# Patient Record
Sex: FEMALE | Race: OTHER
[De-identification: names, ages, dates, MRNs, and addresses within clinical notes are randomized per-mention and may not be internally consistent; named-entity substitution may affect disease eponyms.]

---

## 2017-01-09 ENCOUNTER — HOSPITAL ENCOUNTER (EMERGENCY)
Dept: HOSPITAL 15 - ER | Age: 82
Discharge: HOME | End: 2017-01-09
Payer: COMMERCIAL

## 2017-01-09 VITALS — SYSTOLIC BLOOD PRESSURE: 145 MMHG | DIASTOLIC BLOOD PRESSURE: 72 MMHG

## 2017-01-09 VITALS — HEIGHT: 62 IN | WEIGHT: 140 LBS | BODY MASS INDEX: 25.76 KG/M2

## 2017-01-09 DIAGNOSIS — I10: ICD-10-CM

## 2017-01-09 DIAGNOSIS — E78.5: ICD-10-CM
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
## 2017-01-09 DIAGNOSIS — G89.29: ICD-10-CM

## 2017-01-09 DIAGNOSIS — K51.90: ICD-10-CM

## 2017-01-09 DIAGNOSIS — S39.012A: Primary | ICD-10-CM
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
## 2017-01-09 LAB
HCT VFR BLD AUTO: 41.3 % (ref 36–46)
HGB BLD-MCNC: 13.9 G/DL (ref 12.2–16.2)
MCH RBC QN AUTO: 31.1 PG (ref 28–32)
MCV RBC AUTO: 92.6 FL (ref 80–100)
NRBC BLD QL AUTO: 0 %

## 2017-01-09 PROCEDURE — 93005 ELECTROCARDIOGRAM TRACING: CPT

## 2017-01-09 PROCEDURE — 36415 COLL VENOUS BLD VENIPUNCTURE: CPT

## 2017-01-09 PROCEDURE — 85025 COMPLETE CBC W/AUTO DIFF WBC: CPT

## 2017-05-17 ENCOUNTER — HOSPITAL ENCOUNTER (OUTPATIENT)
Dept: HOSPITAL 15 - LAB | Age: 82
Discharge: HOME | End: 2017-05-17
Attending: INTERNAL MEDICINE
Payer: COMMERCIAL

## 2017-05-17 DIAGNOSIS — E78.5: ICD-10-CM

## 2017-05-17 DIAGNOSIS — I10: Primary | ICD-10-CM

## 2017-05-17 LAB
ALBUMIN SERPL-MCNC: 4 G/DL (ref 3.4–5)
ALP SERPL-CCNC: 81 U/L (ref 45–117)
ANION GAP SERPL CALCULATED.3IONS-SCNC: 9 MMOL/L (ref 5–15)
BILIRUB SERPL-MCNC: 0.4 MG/DL (ref 0.2–1)
BUN SERPL-MCNC: 14 MG/DL (ref 7–18)
BUN/CREAT SERPL: 12.8
CALCIUM SERPL-MCNC: 9.5 MG/DL (ref 8.5–10.1)
CHLORIDE SERPL-SCNC: 100 MMOL/L (ref 98–107)
CHOLEST SERPL-MCNC: 181 MG/DL (ref ?–200)
CO2 SERPL-SCNC: 30 MMOL/L (ref 21–32)
GLUCOSE SERPL-MCNC: 98 MG/DL (ref 74–106)
HCT VFR BLD AUTO: 40.9 % (ref 36–46)
HDLC SERPL-MCNC: 77 MG/DL (ref 40–59)
HGB BLD-MCNC: 13.9 G/DL (ref 12.2–16.2)
MCH RBC QN AUTO: 31.6 PG (ref 28–32)
MCV RBC AUTO: 92.4 FL (ref 80–100)
NRBC BLD QL AUTO: 0 %
POTASSIUM SERPL-SCNC: 4.1 MMOL/L (ref 3.5–5.1)
PROT SERPL-MCNC: 7.8 G/DL (ref 6.4–8.2)
SODIUM SERPL-SCNC: 139 MMOL/L (ref 136–145)
TRIGL SERPL-MCNC: 159 MG/DL (ref ?–150)

## 2017-05-17 PROCEDURE — 36415 COLL VENOUS BLD VENIPUNCTURE: CPT

## 2017-05-17 PROCEDURE — 85025 COMPLETE CBC W/AUTO DIFF WBC: CPT

## 2017-05-17 PROCEDURE — 81001 URINALYSIS AUTO W/SCOPE: CPT

## 2017-05-17 PROCEDURE — 80061 LIPID PANEL: CPT

## 2017-05-17 PROCEDURE — 80053 COMPREHEN METABOLIC PANEL: CPT

## 2017-10-27 LAB
APTT PPP: 29.2 SEC (ref 22.64–33.71)
HCT VFR BLD AUTO: 37.9 % (ref 36–46)
HGB BLD-MCNC: 13.3 G/DL (ref 12.2–16.2)
INR PPP: 1.02 (ref 0.9–1.15)
MCH RBC QN AUTO: 32.7 PG (ref 28–32)
MCV RBC AUTO: 92.9 FL (ref 80–100)
NRBC BLD QL AUTO: 0.1 %
PROTHROMBIN TIME: 11.1 SEC (ref 9.37–12.3)

## 2017-10-30 ENCOUNTER — HOSPITAL ENCOUNTER (OUTPATIENT)
Dept: HOSPITAL 15 - GI | Age: 82
Discharge: HOME | End: 2017-10-30
Attending: INTERNAL MEDICINE
Payer: COMMERCIAL

## 2017-10-30 VITALS — HEIGHT: 62 IN | WEIGHT: 138 LBS | BODY MASS INDEX: 25.4 KG/M2

## 2017-10-30 VITALS — DIASTOLIC BLOOD PRESSURE: 72 MMHG | SYSTOLIC BLOOD PRESSURE: 131 MMHG

## 2017-10-30 DIAGNOSIS — K64.8: Primary | ICD-10-CM

## 2017-10-30 DIAGNOSIS — K57.30: ICD-10-CM

## 2017-10-30 DIAGNOSIS — Z90.49: ICD-10-CM

## 2017-10-30 PROCEDURE — 85025 COMPLETE CBC W/AUTO DIFF WBC: CPT

## 2017-10-30 PROCEDURE — 85730 THROMBOPLASTIN TIME PARTIAL: CPT

## 2017-10-30 PROCEDURE — 85610 PROTHROMBIN TIME: CPT

## 2017-10-30 PROCEDURE — 36415 COLL VENOUS BLD VENIPUNCTURE: CPT

## 2017-10-30 PROCEDURE — 45378 DIAGNOSTIC COLONOSCOPY: CPT

## 2017-10-30 PROCEDURE — 99152 MOD SED SAME PHYS/QHP 5/>YRS: CPT

## 2017-10-30 RX ADMIN — MIDAZOLAM HYDROCHLORIDE ONE MG: 5 INJECTION INTRAMUSCULAR; INTRAVENOUS at 09:42

## 2017-10-30 RX ADMIN — FENTANYL CITRATE ONE MCG: 50 INJECTION, SOLUTION INTRAMUSCULAR; INTRAVENOUS at 09:48

## 2017-10-30 RX ADMIN — FENTANYL CITRATE ONE MCG: 50 INJECTION, SOLUTION INTRAMUSCULAR; INTRAVENOUS at 09:42

## 2017-10-30 RX ADMIN — MIDAZOLAM HYDROCHLORIDE ONE MG: 5 INJECTION INTRAMUSCULAR; INTRAVENOUS at 09:48

## 2018-08-13 ENCOUNTER — HOSPITAL ENCOUNTER (EMERGENCY)
Dept: HOSPITAL 15 - ER | Age: 83
Discharge: HOME | End: 2018-08-13
Payer: COMMERCIAL

## 2018-08-13 VITALS — BODY MASS INDEX: 25.4 KG/M2 | HEIGHT: 62 IN | WEIGHT: 138 LBS

## 2018-08-13 VITALS — SYSTOLIC BLOOD PRESSURE: 152 MMHG | DIASTOLIC BLOOD PRESSURE: 74 MMHG

## 2018-08-13 DIAGNOSIS — E78.5: ICD-10-CM

## 2018-08-13 DIAGNOSIS — F41.9: Primary | ICD-10-CM

## 2018-08-13 DIAGNOSIS — Z79.899: ICD-10-CM

## 2018-08-13 DIAGNOSIS — I10: ICD-10-CM

## 2018-08-13 LAB
ALBUMIN SERPL-MCNC: 3.9 G/DL (ref 3.4–5)
ALP SERPL-CCNC: 70 U/L (ref 45–117)
ALT SERPL-CCNC: 24 U/L (ref 13–56)
ANION GAP SERPL CALCULATED.3IONS-SCNC: 12 MMOL/L (ref 5–15)
BILIRUB SERPL-MCNC: 0.4 MG/DL (ref 0.2–1)
BUN SERPL-MCNC: 16 MG/DL (ref 7–18)
BUN/CREAT SERPL: 12.2
CALCIUM SERPL-MCNC: 8.7 MG/DL (ref 8.5–10.1)
CHLORIDE SERPL-SCNC: 99 MMOL/L (ref 98–107)
CO2 SERPL-SCNC: 25 MMOL/L (ref 21–32)
GLUCOSE SERPL-MCNC: 101 MG/DL (ref 74–106)
HCT VFR BLD AUTO: 38.7 % (ref 36–46)
HGB BLD-MCNC: 13.6 G/DL (ref 12.2–16.2)
MCH RBC QN AUTO: 32.9 PG (ref 28–32)
MCV RBC AUTO: 93.9 FL (ref 80–100)
NRBC BLD QL AUTO: 0.1 %
POTASSIUM SERPL-SCNC: 3.8 MMOL/L (ref 3.5–5.1)
PROT SERPL-MCNC: 7.6 G/DL (ref 6.4–8.2)
SODIUM SERPL-SCNC: 136 MMOL/L (ref 136–145)

## 2018-08-13 PROCEDURE — 85025 COMPLETE CBC W/AUTO DIFF WBC: CPT

## 2018-08-13 PROCEDURE — 70360 X-RAY EXAM OF NECK: CPT

## 2018-08-13 PROCEDURE — 36415 COLL VENOUS BLD VENIPUNCTURE: CPT

## 2018-08-13 PROCEDURE — 84484 ASSAY OF TROPONIN QUANT: CPT

## 2018-08-13 PROCEDURE — 80053 COMPREHEN METABOLIC PANEL: CPT

## 2018-08-13 PROCEDURE — 93005 ELECTROCARDIOGRAM TRACING: CPT

## 2018-10-26 ENCOUNTER — HOSPITAL ENCOUNTER (OUTPATIENT)
Dept: HOSPITAL 15 - LAB | Age: 83
Discharge: HOME | End: 2018-10-26
Attending: NURSE PRACTITIONER
Payer: COMMERCIAL

## 2018-10-26 DIAGNOSIS — Z12.11: Primary | ICD-10-CM

## 2018-10-26 PROCEDURE — 82270 OCCULT BLOOD FECES: CPT

## 2018-11-20 ENCOUNTER — HOSPITAL ENCOUNTER (EMERGENCY)
Dept: HOSPITAL 15 - ER | Age: 83
Discharge: HOME | End: 2018-11-20
Payer: COMMERCIAL

## 2018-11-20 VITALS — HEIGHT: 63 IN | WEIGHT: 140 LBS | BODY MASS INDEX: 24.8 KG/M2

## 2018-11-20 VITALS — DIASTOLIC BLOOD PRESSURE: 77 MMHG | SYSTOLIC BLOOD PRESSURE: 126 MMHG

## 2018-11-20 DIAGNOSIS — I10: ICD-10-CM

## 2018-11-20 DIAGNOSIS — E78.5: ICD-10-CM

## 2018-11-20 DIAGNOSIS — B34.9: Primary | ICD-10-CM

## 2018-12-27 ENCOUNTER — HOSPITAL ENCOUNTER (OUTPATIENT)
Dept: HOSPITAL 15 - LAB | Age: 83
Discharge: HOME | End: 2018-12-27
Attending: NURSE PRACTITIONER
Payer: COMMERCIAL

## 2018-12-27 DIAGNOSIS — E78.5: Primary | ICD-10-CM

## 2018-12-27 LAB
ALBUMIN SERPL-MCNC: 3.9 G/DL (ref 3.4–5)
ALP SERPL-CCNC: 80 U/L (ref 45–117)
ALT SERPL-CCNC: 20 U/L (ref 13–56)
ANION GAP SERPL CALCULATED.3IONS-SCNC: 5 MMOL/L (ref 5–15)
BILIRUB SERPL-MCNC: 0.4 MG/DL (ref 0.2–1)
BUN SERPL-MCNC: 14 MG/DL (ref 7–18)
BUN/CREAT SERPL: 11.9
CALCIUM SERPL-MCNC: 9.5 MG/DL (ref 8.5–10.1)
CHLORIDE SERPL-SCNC: 101 MMOL/L (ref 98–107)
CHOLEST SERPL-MCNC: 199 MG/DL (ref ?–200)
CO2 SERPL-SCNC: 29 MMOL/L (ref 21–32)
GLUCOSE SERPL-MCNC: 102 MG/DL (ref 74–106)
HCT VFR BLD AUTO: 40.9 % (ref 36–46)
HDLC SERPL-MCNC: 74 MG/DL (ref 40–59)
HGB BLD-MCNC: 13.9 G/DL (ref 12.2–16.2)
MCH RBC QN AUTO: 31.6 PG (ref 28–32)
MCV RBC AUTO: 93 FL (ref 80–100)
NRBC BLD QL AUTO: 0 %
POTASSIUM SERPL-SCNC: 3.8 MMOL/L (ref 3.5–5.1)
PROT SERPL-MCNC: 7.7 G/DL (ref 6.4–8.2)
SODIUM SERPL-SCNC: 135 MMOL/L (ref 136–145)
TRIGL SERPL-MCNC: 159 MG/DL (ref ?–150)

## 2018-12-27 PROCEDURE — 81001 URINALYSIS AUTO W/SCOPE: CPT

## 2018-12-27 PROCEDURE — 80053 COMPREHEN METABOLIC PANEL: CPT

## 2018-12-27 PROCEDURE — 84443 ASSAY THYROID STIM HORMONE: CPT

## 2018-12-27 PROCEDURE — 36415 COLL VENOUS BLD VENIPUNCTURE: CPT

## 2018-12-27 PROCEDURE — 80061 LIPID PANEL: CPT

## 2018-12-27 PROCEDURE — 83036 HEMOGLOBIN GLYCOSYLATED A1C: CPT

## 2018-12-27 PROCEDURE — 85025 COMPLETE CBC W/AUTO DIFF WBC: CPT

## 2018-12-27 PROCEDURE — 82306 VITAMIN D 25 HYDROXY: CPT

## 2019-04-04 ENCOUNTER — HOSPITAL ENCOUNTER (OUTPATIENT)
Dept: HOSPITAL 15 - LAB | Age: 84
Discharge: HOME | End: 2019-04-04
Attending: NURSE PRACTITIONER
Payer: COMMERCIAL

## 2019-04-04 DIAGNOSIS — E78.5: Primary | ICD-10-CM

## 2019-04-04 LAB
ALBUMIN SERPL-MCNC: 4.2 G/DL (ref 3.4–5)
ALP SERPL-CCNC: 90 U/L (ref 45–117)
ALT SERPL-CCNC: 21 U/L (ref 13–56)
ANION GAP SERPL CALCULATED.3IONS-SCNC: 4 MMOL/L (ref 5–15)
BILIRUB SERPL-MCNC: 0.5 MG/DL (ref 0.2–1)
BUN SERPL-MCNC: 17 MG/DL (ref 7–18)
BUN/CREAT SERPL: 13.3
CALCIUM SERPL-MCNC: 9.6 MG/DL (ref 8.5–10.1)
CHLORIDE SERPL-SCNC: 103 MMOL/L (ref 98–107)
CHOLEST SERPL-MCNC: 204 MG/DL (ref ?–200)
CO2 SERPL-SCNC: 29 MMOL/L (ref 21–32)
GLUCOSE SERPL-MCNC: 100 MG/DL (ref 74–106)
HCT VFR BLD AUTO: 42.9 % (ref 36–46)
HDLC SERPL-MCNC: 70 MG/DL (ref 40–59)
HGB BLD-MCNC: 14.7 G/DL (ref 12.2–16.2)
MCH RBC QN AUTO: 32 PG (ref 28–32)
MCV RBC AUTO: 93.5 FL (ref 80–100)
NRBC BLD QL AUTO: 0 %
POTASSIUM SERPL-SCNC: 4 MMOL/L (ref 3.5–5.1)
PROT SERPL-MCNC: 8.1 G/DL (ref 6.4–8.2)
SODIUM SERPL-SCNC: 136 MMOL/L (ref 136–145)
TRIGL SERPL-MCNC: 233 MG/DL (ref ?–150)

## 2019-04-04 PROCEDURE — 36415 COLL VENOUS BLD VENIPUNCTURE: CPT

## 2019-04-04 PROCEDURE — 85025 COMPLETE CBC W/AUTO DIFF WBC: CPT

## 2019-04-04 PROCEDURE — 82607 VITAMIN B-12: CPT

## 2019-04-04 PROCEDURE — 82746 ASSAY OF FOLIC ACID SERUM: CPT

## 2019-04-04 PROCEDURE — 83036 HEMOGLOBIN GLYCOSYLATED A1C: CPT

## 2019-04-04 PROCEDURE — 82306 VITAMIN D 25 HYDROXY: CPT

## 2019-04-04 PROCEDURE — 82043 UR ALBUMIN QUANTITATIVE: CPT

## 2019-04-04 PROCEDURE — 84443 ASSAY THYROID STIM HORMONE: CPT

## 2019-04-04 PROCEDURE — 81001 URINALYSIS AUTO W/SCOPE: CPT

## 2019-04-04 PROCEDURE — 80061 LIPID PANEL: CPT

## 2019-04-04 PROCEDURE — 80053 COMPREHEN METABOLIC PANEL: CPT

## 2019-07-08 ENCOUNTER — HOSPITAL ENCOUNTER (OUTPATIENT)
Dept: HOSPITAL 15 - LAB | Age: 84
Discharge: HOME | End: 2019-07-08
Attending: NURSE PRACTITIONER
Payer: COMMERCIAL

## 2019-07-08 DIAGNOSIS — E78.5: Primary | ICD-10-CM

## 2019-07-08 LAB
ALBUMIN SERPL-MCNC: 3.9 G/DL (ref 3.4–5)
ALP SERPL-CCNC: 92 U/L (ref 45–117)
ALT SERPL-CCNC: 18 U/L (ref 13–56)
ANION GAP SERPL CALCULATED.3IONS-SCNC: 7 MMOL/L (ref 5–15)
BILIRUB SERPL-MCNC: 0.5 MG/DL (ref 0.2–1)
BUN SERPL-MCNC: 17 MG/DL (ref 7–18)
BUN/CREAT SERPL: 13.2
CALCIUM SERPL-MCNC: 9.4 MG/DL (ref 8.5–10.1)
CHLORIDE SERPL-SCNC: 104 MMOL/L (ref 98–107)
CHOLEST SERPL-MCNC: 181 MG/DL (ref ?–200)
CO2 SERPL-SCNC: 28 MMOL/L (ref 21–32)
GLUCOSE SERPL-MCNC: 100 MG/DL (ref 74–106)
HCT VFR BLD AUTO: 37.9 % (ref 36–46)
HDLC SERPL-MCNC: 74 MG/DL (ref 40–59)
HGB BLD-MCNC: 13.2 G/DL (ref 12.2–16.2)
MCH RBC QN AUTO: 32.2 PG (ref 28–32)
MCV RBC AUTO: 92.8 FL (ref 80–100)
NRBC BLD QL AUTO: 0 %
POTASSIUM SERPL-SCNC: 4.3 MMOL/L (ref 3.5–5.1)
PROT SERPL-MCNC: 7.5 G/DL (ref 6.4–8.2)
SODIUM SERPL-SCNC: 139 MMOL/L (ref 136–145)
TRIGL SERPL-MCNC: 161 MG/DL (ref ?–150)

## 2019-07-08 PROCEDURE — 36415 COLL VENOUS BLD VENIPUNCTURE: CPT

## 2019-07-08 PROCEDURE — 80061 LIPID PANEL: CPT

## 2019-07-08 PROCEDURE — 84443 ASSAY THYROID STIM HORMONE: CPT

## 2019-07-08 PROCEDURE — 83036 HEMOGLOBIN GLYCOSYLATED A1C: CPT

## 2019-07-08 PROCEDURE — 81001 URINALYSIS AUTO W/SCOPE: CPT

## 2019-07-08 PROCEDURE — 85025 COMPLETE CBC W/AUTO DIFF WBC: CPT

## 2019-07-08 PROCEDURE — 80053 COMPREHEN METABOLIC PANEL: CPT

## 2019-09-09 ENCOUNTER — HOSPITAL ENCOUNTER (OUTPATIENT)
Dept: HOSPITAL 15 - LAB | Age: 84
Discharge: HOME | End: 2019-09-09
Attending: NURSE PRACTITIONER
Payer: COMMERCIAL

## 2019-09-09 DIAGNOSIS — R73.03: Primary | ICD-10-CM

## 2019-09-09 LAB
ALBUMIN SERPL-MCNC: 3.9 G/DL (ref 3.4–5)
ALP SERPL-CCNC: 75 U/L (ref 45–117)
ALT SERPL-CCNC: 14 U/L (ref 13–56)
ANION GAP SERPL CALCULATED.3IONS-SCNC: 7 MMOL/L (ref 5–15)
BILIRUB SERPL-MCNC: 0.4 MG/DL (ref 0.2–1)
BUN SERPL-MCNC: 22 MG/DL (ref 7–18)
BUN/CREAT SERPL: 17.2
CALCIUM SERPL-MCNC: 9.1 MG/DL (ref 8.5–10.1)
CHLORIDE SERPL-SCNC: 106 MMOL/L (ref 98–107)
CO2 SERPL-SCNC: 28 MMOL/L (ref 21–32)
GLUCOSE SERPL-MCNC: 94 MG/DL (ref 74–106)
HCT VFR BLD AUTO: 39.2 % (ref 36–46)
HGB BLD-MCNC: 13.3 G/DL (ref 12.2–16.2)
MCH RBC QN AUTO: 31.9 PG (ref 28–32)
MCV RBC AUTO: 94.1 FL (ref 80–100)
NRBC BLD QL AUTO: 0 %
POTASSIUM SERPL-SCNC: 4.1 MMOL/L (ref 3.5–5.1)
PROT SERPL-MCNC: 7.5 G/DL (ref 6.4–8.2)
SODIUM SERPL-SCNC: 141 MMOL/L (ref 136–145)

## 2019-09-09 PROCEDURE — 80053 COMPREHEN METABOLIC PANEL: CPT

## 2019-09-09 PROCEDURE — 83036 HEMOGLOBIN GLYCOSYLATED A1C: CPT

## 2019-09-09 PROCEDURE — 36415 COLL VENOUS BLD VENIPUNCTURE: CPT

## 2019-09-09 PROCEDURE — 85025 COMPLETE CBC W/AUTO DIFF WBC: CPT

## 2020-10-08 ENCOUNTER — HOSPITAL ENCOUNTER (OUTPATIENT)
Dept: HOSPITAL 15 - LAB | Age: 85
Discharge: HOME | End: 2020-10-08
Attending: NURSE PRACTITIONER
Payer: COMMERCIAL

## 2020-10-08 DIAGNOSIS — E78.5: ICD-10-CM

## 2020-10-08 DIAGNOSIS — I10: Primary | ICD-10-CM

## 2020-10-08 LAB
ALBUMIN SERPL-MCNC: 3.8 G/DL (ref 3.4–5)
ALP SERPL-CCNC: 100 U/L (ref 45–117)
ALT SERPL-CCNC: 15 U/L (ref 13–56)
ANION GAP SERPL CALCULATED.3IONS-SCNC: 6 MMOL/L (ref 5–15)
BILIRUB SERPL-MCNC: 0.6 MG/DL (ref 0.2–1)
BUN SERPL-MCNC: 16 MG/DL (ref 7–18)
BUN/CREAT SERPL: 11
CALCIUM SERPL-MCNC: 9.2 MG/DL (ref 8.5–10.1)
CHLORIDE SERPL-SCNC: 105 MMOL/L (ref 98–107)
CHOLEST SERPL-MCNC: 148 MG/DL (ref ?–200)
CO2 SERPL-SCNC: 27 MMOL/L (ref 21–32)
GLUCOSE SERPL-MCNC: 100 MG/DL (ref 74–106)
HCT VFR BLD AUTO: 35.7 % (ref 36–46)
HDLC SERPL-MCNC: 74 MG/DL (ref 40–59)
HGB BLD-MCNC: 12.4 G/DL (ref 12.2–16.2)
MCH RBC QN AUTO: 32 PG (ref 28–32)
MCV RBC AUTO: 92.4 FL (ref 80–100)
NRBC BLD QL AUTO: 0 %
POTASSIUM SERPL-SCNC: 4 MMOL/L (ref 3.5–5.1)
PROT SERPL-MCNC: 7.4 G/DL (ref 6.4–8.2)
SODIUM SERPL-SCNC: 138 MMOL/L (ref 136–145)
TRIGL SERPL-MCNC: 134 MG/DL (ref ?–150)

## 2020-10-08 PROCEDURE — 80061 LIPID PANEL: CPT

## 2020-10-08 PROCEDURE — 80053 COMPREHEN METABOLIC PANEL: CPT

## 2020-10-08 PROCEDURE — 84443 ASSAY THYROID STIM HORMONE: CPT

## 2020-10-08 PROCEDURE — 81001 URINALYSIS AUTO W/SCOPE: CPT

## 2020-10-08 PROCEDURE — 85025 COMPLETE CBC W/AUTO DIFF WBC: CPT

## 2020-10-08 PROCEDURE — 36415 COLL VENOUS BLD VENIPUNCTURE: CPT

## 2021-05-18 ENCOUNTER — HOSPITAL ENCOUNTER (OUTPATIENT)
Dept: HOSPITAL 15 - LAB | Age: 86
Discharge: HOME | End: 2021-05-18
Attending: NURSE PRACTITIONER
Payer: COMMERCIAL

## 2021-05-18 DIAGNOSIS — E78.5: Primary | ICD-10-CM

## 2021-05-18 DIAGNOSIS — I10: ICD-10-CM

## 2021-05-18 LAB
ALBUMIN SERPL-MCNC: 3.8 G/DL (ref 3.4–5)
ALP SERPL-CCNC: 108 U/L (ref 45–117)
ALT SERPL-CCNC: 20 U/L (ref 13–56)
ANION GAP SERPL CALCULATED.3IONS-SCNC: 6 MMOL/L (ref 5–15)
BILIRUB SERPL-MCNC: 0.5 MG/DL (ref 0.2–1)
BUN SERPL-MCNC: 16 MG/DL (ref 7–18)
BUN/CREAT SERPL: 11.3
CALCIUM SERPL-MCNC: 9.1 MG/DL (ref 8.5–10.1)
CHLORIDE SERPL-SCNC: 106 MMOL/L (ref 98–107)
CHOLEST SERPL-MCNC: 167 MG/DL (ref ?–200)
CO2 SERPL-SCNC: 27 MMOL/L (ref 21–32)
GLUCOSE SERPL-MCNC: 101 MG/DL (ref 74–106)
HCT VFR BLD AUTO: 36.5 % (ref 36–46)
HDLC SERPL-MCNC: 83 MG/DL (ref 40–59)
HGB BLD-MCNC: 12.9 G/DL (ref 12.2–16.2)
MCH RBC QN AUTO: 32.7 PG (ref 28–32)
MCV RBC AUTO: 92.6 FL (ref 80–100)
NRBC BLD QL AUTO: 0 %
POTASSIUM SERPL-SCNC: 4.2 MMOL/L (ref 3.5–5.1)
PROT SERPL-MCNC: 7.5 G/DL (ref 6.4–8.2)
SODIUM SERPL-SCNC: 139 MMOL/L (ref 136–145)
TRIGL SERPL-MCNC: 99 MG/DL (ref ?–150)

## 2021-05-18 PROCEDURE — 85025 COMPLETE CBC W/AUTO DIFF WBC: CPT

## 2021-05-18 PROCEDURE — 36415 COLL VENOUS BLD VENIPUNCTURE: CPT

## 2021-05-18 PROCEDURE — 81001 URINALYSIS AUTO W/SCOPE: CPT

## 2021-05-18 PROCEDURE — 80053 COMPREHEN METABOLIC PANEL: CPT

## 2021-05-18 PROCEDURE — 80061 LIPID PANEL: CPT

## 2022-02-04 ENCOUNTER — HOSPITAL ENCOUNTER (OUTPATIENT)
Dept: HOSPITAL 15 - LAB | Age: 87
Discharge: HOME | End: 2022-02-04
Attending: INTERNAL MEDICINE
Payer: COMMERCIAL

## 2022-02-04 DIAGNOSIS — M35.3: ICD-10-CM

## 2022-02-04 DIAGNOSIS — M05.79: Primary | ICD-10-CM

## 2022-02-04 DIAGNOSIS — R94.5: ICD-10-CM

## 2022-02-04 LAB
ALBUMIN SERPL-MCNC: 3.3 G/DL (ref 3.4–5)
ALP SERPL-CCNC: 91 U/L (ref 45–117)
ALT SERPL-CCNC: 17 U/L (ref 13–56)
ANION GAP SERPL CALCULATED.3IONS-SCNC: 5 MMOL/L (ref 5–15)
BILIRUB SERPL-MCNC: 0.4 MG/DL (ref 0.2–1)
BUN SERPL-MCNC: 11 MG/DL (ref 7–18)
BUN/CREAT SERPL: 7.1
CALCIUM SERPL-MCNC: 8.9 MG/DL (ref 8.5–10.1)
CHLORIDE SERPL-SCNC: 105 MMOL/L (ref 98–107)
CO2 SERPL-SCNC: 25 MMOL/L (ref 21–32)
GLUCOSE SERPL-MCNC: 121 MG/DL (ref 74–106)
HCT VFR BLD AUTO: 34.7 % (ref 36–46)
HGB BLD-MCNC: 11.9 G/DL (ref 12.2–16.2)
MCH RBC QN AUTO: 30.9 PG (ref 28–32)
MCV RBC AUTO: 89.9 FL (ref 80–100)
NRBC BLD QL AUTO: 0.1 %
POTASSIUM SERPL-SCNC: 4.3 MMOL/L (ref 3.5–5.1)
PROT SERPL-MCNC: 7.2 G/DL (ref 6.4–8.2)
SODIUM SERPL-SCNC: 135 MMOL/L (ref 136–145)

## 2022-02-04 PROCEDURE — 36415 COLL VENOUS BLD VENIPUNCTURE: CPT

## 2022-02-04 PROCEDURE — 85025 COMPLETE CBC W/AUTO DIFF WBC: CPT

## 2022-02-04 PROCEDURE — 85652 RBC SED RATE AUTOMATED: CPT

## 2022-02-04 PROCEDURE — 80053 COMPREHEN METABOLIC PANEL: CPT

## 2022-02-04 PROCEDURE — 87340 HEPATITIS B SURFACE AG IA: CPT

## 2022-02-04 PROCEDURE — 86141 C-REACTIVE PROTEIN HS: CPT

## 2022-02-04 PROCEDURE — 86431 RHEUMATOID FACTOR QUANT: CPT

## 2022-02-04 PROCEDURE — 86200 CCP ANTIBODY: CPT

## 2022-02-04 PROCEDURE — 86704 HEP B CORE ANTIBODY TOTAL: CPT

## 2022-02-04 PROCEDURE — 87902 NFCT AGT GNTYP ALYS HEP C: CPT

## 2022-02-14 ENCOUNTER — HOSPITAL ENCOUNTER (OUTPATIENT)
Dept: HOSPITAL 15 - XYW | Age: 87
Discharge: HOME | End: 2022-02-14
Attending: INTERNAL MEDICINE
Payer: COMMERCIAL

## 2022-02-14 DIAGNOSIS — I08.1: Primary | ICD-10-CM

## 2022-02-14 PROCEDURE — 93306 TTE W/DOPPLER COMPLETE: CPT

## 2022-06-13 ENCOUNTER — HOSPITAL ENCOUNTER (OUTPATIENT)
Dept: HOSPITAL 15 - LAB | Age: 87
Discharge: HOME | End: 2022-06-13
Attending: INTERNAL MEDICINE
Payer: COMMERCIAL

## 2022-06-13 DIAGNOSIS — L40.50: Primary | ICD-10-CM

## 2022-06-13 LAB
ALBUMIN SERPL-MCNC: 3.6 G/DL (ref 3.4–5)
ALP SERPL-CCNC: 59 U/L (ref 45–117)
ALT SERPL-CCNC: 23 U/L (ref 13–56)
ANION GAP SERPL CALCULATED.3IONS-SCNC: 7 MMOL/L (ref 5–15)
BILIRUB SERPL-MCNC: 0.4 MG/DL (ref 0.2–1)
BUN SERPL-MCNC: 18 MG/DL (ref 7–18)
BUN/CREAT SERPL: 10.9
CALCIUM SERPL-MCNC: 8.5 MG/DL (ref 8.5–10.1)
CHLORIDE SERPL-SCNC: 108 MMOL/L (ref 98–107)
CO2 SERPL-SCNC: 25 MMOL/L (ref 21–32)
GLUCOSE SERPL-MCNC: 111 MG/DL (ref 74–106)
HCT VFR BLD AUTO: 34.3 % (ref 36–46)
HGB BLD-MCNC: 11.9 G/DL (ref 12.2–16.2)
MCH RBC QN AUTO: 33 PG (ref 28–32)
MCV RBC AUTO: 94.7 FL (ref 80–100)
NRBC BLD QL AUTO: 0 %
POTASSIUM SERPL-SCNC: 4.4 MMOL/L (ref 3.5–5.1)
PROT SERPL-MCNC: 6.5 G/DL (ref 6.4–8.2)
SODIUM SERPL-SCNC: 140 MMOL/L (ref 136–145)

## 2022-06-13 PROCEDURE — 80053 COMPREHEN METABOLIC PANEL: CPT

## 2022-06-13 PROCEDURE — 85652 RBC SED RATE AUTOMATED: CPT

## 2022-06-13 PROCEDURE — 85025 COMPLETE CBC W/AUTO DIFF WBC: CPT

## 2022-06-13 PROCEDURE — 36415 COLL VENOUS BLD VENIPUNCTURE: CPT

## 2022-06-13 PROCEDURE — 86141 C-REACTIVE PROTEIN HS: CPT

## 2022-06-20 ENCOUNTER — HOSPITAL ENCOUNTER (OUTPATIENT)
Dept: HOSPITAL 15 - LAB | Age: 87
Discharge: HOME | End: 2022-06-20
Attending: NURSE PRACTITIONER
Payer: COMMERCIAL

## 2022-06-20 DIAGNOSIS — I10: ICD-10-CM

## 2022-06-20 DIAGNOSIS — E78.5: Primary | ICD-10-CM

## 2022-06-20 LAB
ALBUMIN SERPL-MCNC: 3.6 G/DL (ref 3.4–5)
ALP SERPL-CCNC: 58 U/L (ref 45–117)
ALT SERPL-CCNC: 23 U/L (ref 13–56)
ANION GAP SERPL CALCULATED.3IONS-SCNC: 5 MMOL/L (ref 5–15)
BILIRUB SERPL-MCNC: 0.5 MG/DL (ref 0.2–1)
BUN SERPL-MCNC: 11 MG/DL (ref 7–18)
BUN/CREAT SERPL: 7
CALCIUM SERPL-MCNC: 9.2 MG/DL (ref 8.5–10.1)
CHLORIDE SERPL-SCNC: 104 MMOL/L (ref 98–107)
CHOLEST SERPL-MCNC: 182 MG/DL (ref ?–200)
CO2 SERPL-SCNC: 28 MMOL/L (ref 21–32)
GLUCOSE SERPL-MCNC: 96 MG/DL (ref 74–106)
HCT VFR BLD AUTO: 36.4 % (ref 36–46)
HDLC SERPL-MCNC: 99 MG/DL (ref 40–59)
HGB BLD-MCNC: 12.8 G/DL (ref 12.2–16.2)
MCH RBC QN AUTO: 33.2 PG (ref 28–32)
MCV RBC AUTO: 94.2 FL (ref 80–100)
NRBC BLD QL AUTO: 0 %
POTASSIUM SERPL-SCNC: 3.9 MMOL/L (ref 3.5–5.1)
PROT SERPL-MCNC: 6.8 G/DL (ref 6.4–8.2)
SODIUM SERPL-SCNC: 137 MMOL/L (ref 136–145)
TRIGL SERPL-MCNC: 171 MG/DL (ref ?–150)

## 2022-06-20 PROCEDURE — 80053 COMPREHEN METABOLIC PANEL: CPT

## 2022-06-20 PROCEDURE — 80061 LIPID PANEL: CPT

## 2022-06-20 PROCEDURE — 36415 COLL VENOUS BLD VENIPUNCTURE: CPT

## 2022-06-20 PROCEDURE — 81001 URINALYSIS AUTO W/SCOPE: CPT

## 2022-06-20 PROCEDURE — 85025 COMPLETE CBC W/AUTO DIFF WBC: CPT

## 2022-07-05 ENCOUNTER — HOSPITAL ENCOUNTER (OUTPATIENT)
Dept: HOSPITAL 15 - XYW | Age: 87
Discharge: HOME | End: 2022-07-05
Attending: INTERNAL MEDICINE
Payer: COMMERCIAL

## 2022-07-05 VITALS — DIASTOLIC BLOOD PRESSURE: 54 MMHG | SYSTOLIC BLOOD PRESSURE: 129 MMHG

## 2022-07-05 VITALS — HEIGHT: 63 IN | WEIGHT: 135 LBS | BODY MASS INDEX: 23.92 KG/M2

## 2022-07-05 DIAGNOSIS — I10: ICD-10-CM

## 2022-07-05 DIAGNOSIS — I49.9: ICD-10-CM

## 2022-07-05 DIAGNOSIS — I47.1: Primary | ICD-10-CM

## 2022-07-05 PROCEDURE — 93017 CV STRESS TEST TRACING ONLY: CPT

## 2022-07-05 PROCEDURE — 78452 HT MUSCLE IMAGE SPECT MULT: CPT

## 2022-09-13 ENCOUNTER — HOSPITAL ENCOUNTER (OUTPATIENT)
Dept: HOSPITAL 15 - LAB | Age: 87
Discharge: HOME | End: 2022-09-13
Attending: INTERNAL MEDICINE
Payer: COMMERCIAL

## 2022-09-13 DIAGNOSIS — L40.50: Primary | ICD-10-CM

## 2022-09-13 LAB
ALBUMIN SERPL-MCNC: 3.8 G/DL (ref 3.4–5)
ALP SERPL-CCNC: 58 U/L (ref 45–117)
ALT SERPL-CCNC: 22 U/L (ref 13–56)
ANION GAP SERPL CALCULATED.3IONS-SCNC: 6 MMOL/L (ref 5–15)
BILIRUB SERPL-MCNC: 0.4 MG/DL (ref 0.2–1)
BUN SERPL-MCNC: 15 MG/DL (ref 7–18)
BUN/CREAT SERPL: 9.3
CALCIUM SERPL-MCNC: 8.8 MG/DL (ref 8.5–10.1)
CHLORIDE SERPL-SCNC: 106 MMOL/L (ref 98–107)
CO2 SERPL-SCNC: 28 MMOL/L (ref 21–32)
GLUCOSE SERPL-MCNC: 100 MG/DL (ref 74–106)
HCT VFR BLD AUTO: 37.6 % (ref 36–46)
HGB BLD-MCNC: 12.6 G/DL (ref 12.2–16.2)
MCH RBC QN AUTO: 32.7 PG (ref 28–32)
MCV RBC AUTO: 97.6 FL (ref 80–100)
NRBC BLD QL AUTO: 0 %
POTASSIUM SERPL-SCNC: 4.1 MMOL/L (ref 3.5–5.1)
PROT SERPL-MCNC: 6.5 G/DL (ref 6.4–8.2)
SODIUM SERPL-SCNC: 140 MMOL/L (ref 136–145)

## 2022-09-13 PROCEDURE — 85025 COMPLETE CBC W/AUTO DIFF WBC: CPT

## 2022-09-13 PROCEDURE — 80053 COMPREHEN METABOLIC PANEL: CPT

## 2022-09-13 PROCEDURE — 36415 COLL VENOUS BLD VENIPUNCTURE: CPT

## 2022-09-13 PROCEDURE — 86141 C-REACTIVE PROTEIN HS: CPT

## 2022-09-13 PROCEDURE — 85652 RBC SED RATE AUTOMATED: CPT

## 2023-03-20 ENCOUNTER — HOSPITAL ENCOUNTER (OUTPATIENT)
Dept: HOSPITAL 15 - LAB | Age: 88
Discharge: HOME | End: 2023-03-20
Attending: NURSE PRACTITIONER
Payer: COMMERCIAL

## 2023-03-20 DIAGNOSIS — E78.5: ICD-10-CM

## 2023-03-20 DIAGNOSIS — I10: Primary | ICD-10-CM

## 2023-03-20 LAB
ALBUMIN SERPL-MCNC: 3.5 G/DL (ref 3.4–5)
ALP SERPL-CCNC: 71 U/L (ref 45–117)
ALT SERPL-CCNC: 19 U/L (ref 13–56)
ANION GAP SERPL CALCULATED.3IONS-SCNC: 4 MMOL/L (ref 5–15)
BILIRUB SERPL-MCNC: 0.4 MG/DL (ref 0.2–1)
BUN SERPL-MCNC: 14 MG/DL (ref 7–18)
BUN/CREAT SERPL: 9 (ref 10–20)
CALCIUM SERPL-MCNC: 9.6 MG/DL (ref 8.5–10.1)
CHLORIDE SERPL-SCNC: 104 MMOL/L (ref 98–107)
CHOLEST SERPL-MCNC: 139 MG/DL (ref ?–200)
CO2 SERPL-SCNC: 29 MMOL/L (ref 21–32)
GLUCOSE SERPL-MCNC: 103 MG/DL (ref 74–106)
HCT VFR BLD AUTO: 36.5 % (ref 36–46)
HDLC SERPL-MCNC: 84 MG/DL (ref 40–59)
HGB BLD-MCNC: 12.7 G/DL (ref 12.2–16.2)
MCH RBC QN AUTO: 33.1 PG (ref 28–32)
MCV RBC AUTO: 95.4 FL (ref 80–100)
NRBC BLD QL AUTO: 0 %
POTASSIUM SERPL-SCNC: 4.4 MMOL/L (ref 3.5–5.1)
PROT SERPL-MCNC: 7 G/DL (ref 6.4–8.2)
SODIUM SERPL-SCNC: 137 MMOL/L (ref 136–145)
TRIGL SERPL-MCNC: 96 MG/DL (ref ?–150)

## 2023-03-20 PROCEDURE — 80061 LIPID PANEL: CPT

## 2023-03-20 PROCEDURE — 36415 COLL VENOUS BLD VENIPUNCTURE: CPT

## 2023-03-20 PROCEDURE — 85025 COMPLETE CBC W/AUTO DIFF WBC: CPT

## 2023-03-20 PROCEDURE — 80053 COMPREHEN METABOLIC PANEL: CPT

## 2023-03-20 PROCEDURE — 81001 URINALYSIS AUTO W/SCOPE: CPT

## 2023-03-20 PROCEDURE — 84443 ASSAY THYROID STIM HORMONE: CPT

## 2023-05-09 ENCOUNTER — HOSPITAL ENCOUNTER (OUTPATIENT)
Dept: HOSPITAL 15 - LAB | Age: 88
Discharge: HOME | End: 2023-05-09
Attending: INTERNAL MEDICINE
Payer: COMMERCIAL

## 2023-05-09 DIAGNOSIS — L40.50: Primary | ICD-10-CM

## 2023-05-09 DIAGNOSIS — Z79.899: ICD-10-CM

## 2023-05-09 LAB
ALBUMIN SERPL-MCNC: 3.7 G/DL (ref 3.4–5)
ALP SERPL-CCNC: 85 U/L (ref 45–117)
ALT SERPL-CCNC: 19 U/L (ref 13–56)
ANION GAP SERPL CALCULATED.3IONS-SCNC: 5 MMOL/L (ref 5–15)
BILIRUB SERPL-MCNC: 0.3 MG/DL (ref 0.2–1)
BUN SERPL-MCNC: 15 MG/DL (ref 7–18)
BUN/CREAT SERPL: 9 (ref 10–20)
CALCIUM SERPL-MCNC: 9.1 MG/DL (ref 8.5–10.1)
CHLORIDE SERPL-SCNC: 102 MMOL/L (ref 98–107)
CO2 SERPL-SCNC: 27 MMOL/L (ref 21–32)
GLUCOSE SERPL-MCNC: 115 MG/DL (ref 74–106)
HCT VFR BLD AUTO: 35 % (ref 36–46)
HGB BLD-MCNC: 11.9 G/DL (ref 12.2–16.2)
MCH RBC QN AUTO: 33 PG (ref 28–32)
MCV RBC AUTO: 96.6 FL (ref 80–100)
NRBC BLD QL AUTO: 0 %
POTASSIUM SERPL-SCNC: 4.7 MMOL/L (ref 3.5–5.1)
PROT SERPL-MCNC: 6.9 G/DL (ref 6.4–8.2)
SODIUM SERPL-SCNC: 134 MMOL/L (ref 136–145)

## 2023-05-09 PROCEDURE — 80053 COMPREHEN METABOLIC PANEL: CPT

## 2023-05-09 PROCEDURE — 36415 COLL VENOUS BLD VENIPUNCTURE: CPT

## 2023-05-09 PROCEDURE — 85025 COMPLETE CBC W/AUTO DIFF WBC: CPT

## 2023-05-09 PROCEDURE — 85652 RBC SED RATE AUTOMATED: CPT

## 2023-05-09 PROCEDURE — 86141 C-REACTIVE PROTEIN HS: CPT

## 2023-07-14 ENCOUNTER — HOSPITAL ENCOUNTER (OUTPATIENT)
Dept: HOSPITAL 15 - LAB | Age: 88
Discharge: HOME | End: 2023-07-14
Attending: INTERNAL MEDICINE
Payer: COMMERCIAL

## 2023-07-14 DIAGNOSIS — Z11.1: Primary | ICD-10-CM

## 2023-07-14 DIAGNOSIS — L40.50: ICD-10-CM

## 2023-07-14 DIAGNOSIS — Z79.899: ICD-10-CM

## 2023-07-14 LAB
ALBUMIN SERPL-MCNC: 3.7 G/DL (ref 3.4–5)
ALP SERPL-CCNC: 84 U/L (ref 45–117)
ALT SERPL-CCNC: 19 U/L (ref 13–56)
ANION GAP SERPL CALCULATED.3IONS-SCNC: 5 MMOL/L (ref 5–15)
BILIRUB SERPL-MCNC: 0.8 MG/DL (ref 0.2–1)
BUN SERPL-MCNC: 9 MG/DL (ref 7–18)
BUN/CREAT SERPL: 6.4 (ref 10–20)
CALCIUM SERPL-MCNC: 9.3 MG/DL (ref 8.5–10.1)
CHLORIDE SERPL-SCNC: 105 MMOL/L (ref 98–107)
CO2 SERPL-SCNC: 26 MMOL/L (ref 21–32)
GLUCOSE SERPL-MCNC: 98 MG/DL (ref 74–106)
HCT VFR BLD AUTO: 36.6 % (ref 36–46)
HGB BLD-MCNC: 12.6 G/DL (ref 12.2–16.2)
MCH RBC QN AUTO: 32.1 PG (ref 28–32)
MCV RBC AUTO: 93.3 FL (ref 80–100)
NRBC BLD QL AUTO: 0 %
POTASSIUM SERPL-SCNC: 4.3 MMOL/L (ref 3.5–5.1)
PROT SERPL-MCNC: 7.1 G/DL (ref 6.4–8.2)
SODIUM SERPL-SCNC: 136 MMOL/L (ref 136–145)

## 2023-07-14 PROCEDURE — 36415 COLL VENOUS BLD VENIPUNCTURE: CPT

## 2023-07-14 PROCEDURE — 85652 RBC SED RATE AUTOMATED: CPT

## 2023-07-14 PROCEDURE — 86141 C-REACTIVE PROTEIN HS: CPT

## 2023-07-14 PROCEDURE — 80053 COMPREHEN METABOLIC PANEL: CPT

## 2023-07-14 PROCEDURE — 85025 COMPLETE CBC W/AUTO DIFF WBC: CPT

## 2023-09-20 ENCOUNTER — HOSPITAL ENCOUNTER (EMERGENCY)
Dept: HOSPITAL 15 - ER | Age: 88
Discharge: LEFT BEFORE DISCHARGE | End: 2023-09-20
Payer: COMMERCIAL

## 2023-09-20 VITALS — HEIGHT: 60 IN | BODY MASS INDEX: 22.68 KG/M2 | WEIGHT: 115.52 LBS

## 2023-09-20 VITALS
SYSTOLIC BLOOD PRESSURE: 143 MMHG | DIASTOLIC BLOOD PRESSURE: 72 MMHG | HEART RATE: 102 BPM | RESPIRATION RATE: 20 BRPM | OXYGEN SATURATION: 95 %

## 2023-09-20 DIAGNOSIS — M79.10: ICD-10-CM

## 2023-09-20 DIAGNOSIS — R19.7: ICD-10-CM

## 2023-09-20 DIAGNOSIS — R50.9: Primary | ICD-10-CM

## 2023-09-20 DIAGNOSIS — Z53.21: ICD-10-CM

## 2024-01-23 ENCOUNTER — HOSPITAL ENCOUNTER (OUTPATIENT)
Dept: HOSPITAL 15 - LAB | Age: 89
Discharge: HOME | End: 2024-01-23
Attending: INTERNAL MEDICINE
Payer: COMMERCIAL

## 2024-01-23 DIAGNOSIS — I10: Primary | ICD-10-CM

## 2024-01-23 DIAGNOSIS — I48.91: ICD-10-CM

## 2024-01-23 DIAGNOSIS — I70.0: ICD-10-CM

## 2024-01-23 LAB
ALBUMIN SERPL-MCNC: 4.7 G/DL (ref 3.2–4.8)
ALP SERPL-CCNC: 105 U/L (ref 46–116)
ALT SERPL-CCNC: 16 U/L (ref 7–40)
ANION GAP SERPL CALCULATED.3IONS-SCNC: 7 MMOL/L (ref 5–15)
BILIRUB SERPL-MCNC: 0.5 MG/DL (ref 0.2–1)
BUN SERPL-MCNC: 13 MG/DL (ref 9–23)
BUN/CREAT SERPL: 8.8 (ref 10–20)
CALCIUM SERPL-MCNC: 10 MG/DL (ref 8.5–10.1)
CHLORIDE SERPL-SCNC: 105 MMOL/L (ref 98–107)
CO2 SERPL-SCNC: 26 MMOL/L (ref 20–30)
GLUCOSE SERPL-MCNC: 101 MG/DL (ref 74–106)
HCT VFR BLD AUTO: 38.4 % (ref 36–46)
HGB BLD-MCNC: 13 G/DL (ref 12.2–16.2)
MCH RBC QN AUTO: 31.1 PG (ref 28–32)
MCV RBC AUTO: 92.3 FL (ref 80–100)
NRBC BLD QL AUTO: 0 %
POTASSIUM SERPL-SCNC: 4.7 MMOL/L (ref 3.5–5.1)
PROT SERPL-MCNC: 7.3 G/DL (ref 5.7–8.2)
SODIUM SERPL-SCNC: 138 MMOL/L (ref 136–145)
T3FREE SERPL-MCNC: 3.15 PG/ML (ref 2.3–4.2)
T4 FREE SERPL-MCNC: 1.07 NG/DL (ref 0.89–1.76)

## 2024-01-23 PROCEDURE — 85025 COMPLETE CBC W/AUTO DIFF WBC: CPT

## 2024-01-23 PROCEDURE — 84439 ASSAY OF FREE THYROXINE: CPT

## 2024-01-23 PROCEDURE — 36415 COLL VENOUS BLD VENIPUNCTURE: CPT

## 2024-01-23 PROCEDURE — 84481 FREE ASSAY (FT-3): CPT

## 2024-01-23 PROCEDURE — 86141 C-REACTIVE PROTEIN HS: CPT

## 2024-01-23 PROCEDURE — 84443 ASSAY THYROID STIM HORMONE: CPT

## 2024-01-23 PROCEDURE — 80053 COMPREHEN METABOLIC PANEL: CPT

## 2024-01-30 ENCOUNTER — HOSPITAL ENCOUNTER (OUTPATIENT)
Dept: HOSPITAL 15 - XYW | Age: 89
Discharge: HOME | End: 2024-01-30
Attending: INTERNAL MEDICINE
Payer: COMMERCIAL

## 2024-01-30 DIAGNOSIS — I08.0: Primary | ICD-10-CM

## 2024-01-30 DIAGNOSIS — I50.32: ICD-10-CM

## 2024-01-30 PROCEDURE — 93306 TTE W/DOPPLER COMPLETE: CPT

## 2024-02-19 ENCOUNTER — HOSPITAL ENCOUNTER (OUTPATIENT)
Dept: HOSPITAL 15 - LAB | Age: 89
Discharge: HOME | End: 2024-02-19
Attending: INTERNAL MEDICINE
Payer: COMMERCIAL

## 2024-02-19 DIAGNOSIS — Z79.899: ICD-10-CM

## 2024-02-19 DIAGNOSIS — L40.50: Primary | ICD-10-CM

## 2024-02-19 LAB
ALBUMIN SERPL-MCNC: 4.3 G/DL (ref 3.2–4.8)
ALP SERPL-CCNC: 89 U/L (ref 46–116)
ALT SERPL-CCNC: 14 U/L (ref 7–40)
ANION GAP SERPL CALCULATED.3IONS-SCNC: 7 MMOL/L (ref 5–15)
BILIRUB SERPL-MCNC: 0.6 MG/DL (ref 0.2–1)
BUN SERPL-MCNC: 12 MG/DL (ref 9–23)
BUN/CREAT SERPL: 8.6 (ref 10–20)
CALCIUM SERPL-MCNC: 9.5 MG/DL (ref 8.5–10.1)
CHLORIDE SERPL-SCNC: 104 MMOL/L (ref 98–107)
CO2 SERPL-SCNC: 28 MMOL/L (ref 20–30)
GLUCOSE SERPL-MCNC: 99 MG/DL (ref 74–106)
HCT VFR BLD AUTO: 37.9 % (ref 36–46)
HGB BLD-MCNC: 12.6 G/DL (ref 12.2–16.2)
MCH RBC QN AUTO: 30.9 PG (ref 28–32)
MCV RBC AUTO: 92.9 FL (ref 80–100)
NRBC BLD QL AUTO: 0 %
POTASSIUM SERPL-SCNC: 4.1 MMOL/L (ref 3.5–5.1)
PROT SERPL-MCNC: 6.7 G/DL (ref 5.7–8.2)
SODIUM SERPL-SCNC: 139 MMOL/L (ref 136–145)

## 2024-02-19 PROCEDURE — 36415 COLL VENOUS BLD VENIPUNCTURE: CPT

## 2024-02-19 PROCEDURE — 85025 COMPLETE CBC W/AUTO DIFF WBC: CPT

## 2024-02-19 PROCEDURE — 80053 COMPREHEN METABOLIC PANEL: CPT

## 2024-04-22 ENCOUNTER — HOSPITAL ENCOUNTER (EMERGENCY)
Dept: HOSPITAL 15 - ER | Age: 89
Discharge: LEFT BEFORE DISCHARGE | End: 2024-04-22
Payer: COMMERCIAL

## 2024-04-22 VITALS — HEIGHT: 61 IN | WEIGHT: 127.21 LBS | BODY MASS INDEX: 24.02 KG/M2

## 2024-04-22 VITALS
SYSTOLIC BLOOD PRESSURE: 152 MMHG | DIASTOLIC BLOOD PRESSURE: 72 MMHG | RESPIRATION RATE: 18 BRPM | OXYGEN SATURATION: 97 % | HEART RATE: 75 BPM

## 2024-04-22 DIAGNOSIS — I10: ICD-10-CM

## 2024-04-22 DIAGNOSIS — R07.89: ICD-10-CM

## 2024-04-22 DIAGNOSIS — G45.9: Primary | ICD-10-CM

## 2024-04-22 DIAGNOSIS — Z86.73: ICD-10-CM

## 2024-04-22 DIAGNOSIS — E78.5: ICD-10-CM

## 2024-04-22 LAB
ANION GAP SERPL CALCULATED.3IONS-SCNC: 3 MMOL/L (ref 5–15)
BUN SERPL-MCNC: 13 MG/DL (ref 9–23)
BUN/CREAT SERPL: 9.2 (ref 10–20)
CALCIUM SERPL-MCNC: 9.5 MG/DL (ref 8.7–10.4)
CHLORIDE SERPL-SCNC: 105 MMOL/L (ref 98–107)
CO2 SERPL-SCNC: 28 MMOL/L (ref 20–30)
GLUCOSE SERPL-MCNC: 142 MG/DL (ref 74–106)
HCT VFR BLD AUTO: 36 % (ref 36–46)
HGB BLD-MCNC: 12.2 G/DL (ref 12.2–16.2)
MCH RBC QN AUTO: 31.3 PG (ref 28–32)
MCV RBC AUTO: 92.7 FL (ref 80–100)
NRBC BLD QL AUTO: 0.1 %
POTASSIUM SERPL-SCNC: 4.3 MMOL/L (ref 3.5–5.1)
SODIUM SERPL-SCNC: 136 MMOL/L (ref 136–145)

## 2024-04-22 PROCEDURE — 70450 CT HEAD/BRAIN W/O DYE: CPT

## 2024-04-22 PROCEDURE — 85025 COMPLETE CBC W/AUTO DIFF WBC: CPT

## 2024-04-22 PROCEDURE — 36415 COLL VENOUS BLD VENIPUNCTURE: CPT

## 2024-04-22 PROCEDURE — 71045 X-RAY EXAM CHEST 1 VIEW: CPT

## 2024-04-22 PROCEDURE — 80048 BASIC METABOLIC PNL TOTAL CA: CPT

## 2024-05-14 ENCOUNTER — HOSPITAL ENCOUNTER (OUTPATIENT)
Dept: HOSPITAL 15 - LAB | Age: 89
Discharge: HOME | End: 2024-05-14
Attending: INTERNAL MEDICINE
Payer: COMMERCIAL

## 2024-05-14 DIAGNOSIS — L40.50: Primary | ICD-10-CM

## 2024-05-14 DIAGNOSIS — Z79.899: ICD-10-CM

## 2024-05-14 LAB
ALBUMIN SERPL-MCNC: 4.5 G/DL (ref 3.2–4.8)
ALP SERPL-CCNC: 88 U/L (ref 46–116)
ALT SERPL-CCNC: 12 U/L (ref 7–40)
ANION GAP SERPL CALCULATED.3IONS-SCNC: 3 MMOL/L (ref 5–15)
BILIRUB SERPL-MCNC: 0.7 MG/DL (ref 0.2–1)
BUN SERPL-MCNC: 11 MG/DL (ref 9–23)
BUN/CREAT SERPL: 7.6 (ref 10–20)
CALCIUM SERPL-MCNC: 9.9 MG/DL (ref 8.5–10.1)
CHLORIDE SERPL-SCNC: 104 MMOL/L (ref 98–107)
CO2 SERPL-SCNC: 30 MMOL/L (ref 20–30)
GLUCOSE SERPL-MCNC: 101 MG/DL (ref 74–106)
HCT VFR BLD AUTO: 38.4 % (ref 36–46)
HGB BLD-MCNC: 12.6 G/DL (ref 12.2–16.2)
MCH RBC QN AUTO: 30.7 PG (ref 28–32)
MCV RBC AUTO: 93.2 FL (ref 80–100)
NRBC BLD QL AUTO: 0 %
POTASSIUM SERPL-SCNC: 4.4 MMOL/L (ref 3.5–5.1)
PROT SERPL-MCNC: 6.9 G/DL (ref 5.7–8.2)
SODIUM SERPL-SCNC: 137 MMOL/L (ref 136–145)

## 2024-05-14 PROCEDURE — 80053 COMPREHEN METABOLIC PANEL: CPT

## 2024-05-14 PROCEDURE — 85025 COMPLETE CBC W/AUTO DIFF WBC: CPT

## 2024-05-14 PROCEDURE — 85652 RBC SED RATE AUTOMATED: CPT

## 2024-05-14 PROCEDURE — 86141 C-REACTIVE PROTEIN HS: CPT

## 2024-05-14 PROCEDURE — 36415 COLL VENOUS BLD VENIPUNCTURE: CPT

## 2024-07-15 ENCOUNTER — HOSPITAL ENCOUNTER (OUTPATIENT)
Dept: HOSPITAL 15 - LAB | Age: 89
Discharge: HOME | End: 2024-07-15
Attending: PSYCHIATRY & NEUROLOGY
Payer: COMMERCIAL

## 2024-07-15 DIAGNOSIS — G45.9: Primary | ICD-10-CM

## 2024-07-15 DIAGNOSIS — Z86.73: ICD-10-CM

## 2024-07-15 LAB
CHOLEST SERPL-MCNC: 156 MG/DL (ref ?–200)
HDLC SERPL-MCNC: 70 MG/DL (ref 40–59)
TRIGL SERPL-MCNC: 122 MG/DL (ref ?–150)

## 2024-07-15 PROCEDURE — 83036 HEMOGLOBIN GLYCOSYLATED A1C: CPT

## 2024-07-15 PROCEDURE — 80061 LIPID PANEL: CPT

## 2024-07-15 PROCEDURE — 36415 COLL VENOUS BLD VENIPUNCTURE: CPT

## 2024-07-15 PROCEDURE — 84520 ASSAY OF UREA NITROGEN: CPT

## 2024-07-15 PROCEDURE — 82565 ASSAY OF CREATININE: CPT

## 2024-07-16 LAB — BUN SERPL-MCNC: 15 MG/DL (ref 9–23)

## 2024-08-19 ENCOUNTER — HOSPITAL ENCOUNTER (OUTPATIENT)
Dept: HOSPITAL 15 - LAB | Age: 89
Discharge: HOME | End: 2024-08-19
Attending: INTERNAL MEDICINE
Payer: COMMERCIAL

## 2024-08-19 DIAGNOSIS — L40.50: Primary | ICD-10-CM

## 2024-08-19 DIAGNOSIS — Z79.899: ICD-10-CM

## 2024-08-19 LAB
ALBUMIN SERPL-MCNC: 4.2 G/DL (ref 3.2–4.8)
ALP SERPL-CCNC: 88 U/L (ref 46–116)
ALT SERPL-CCNC: 11 U/L (ref 7–40)
ANION GAP SERPL CALCULATED.3IONS-SCNC: 1 MMOL/L (ref 5–15)
BILIRUB SERPL-MCNC: 0.4 MG/DL (ref 0.2–1)
BUN SERPL-MCNC: 14 MG/DL (ref 9–23)
BUN/CREAT SERPL: 9.1 (ref 10–20)
CALCIUM SERPL-MCNC: 9.5 MG/DL (ref 8.7–10.4)
CHLORIDE SERPL-SCNC: 104 MMOL/L (ref 98–107)
CO2 SERPL-SCNC: 30 MMOL/L (ref 20–30)
GLUCOSE SERPL-MCNC: 102 MG/DL (ref 74–106)
HCT VFR BLD AUTO: 37.2 % (ref 36–46)
HGB BLD-MCNC: 12.5 G/DL (ref 12.2–16.2)
MCH RBC QN AUTO: 31.4 PG (ref 28–32)
MCV RBC AUTO: 93.3 FL (ref 80–100)
NRBC BLD QL AUTO: 0 %
POTASSIUM SERPL-SCNC: 4.7 MMOL/L (ref 3.5–5.1)
PROT SERPL-MCNC: 6.5 G/DL (ref 5.7–8.2)
SODIUM SERPL-SCNC: 135 MMOL/L (ref 136–145)

## 2024-08-19 PROCEDURE — 85025 COMPLETE CBC W/AUTO DIFF WBC: CPT

## 2024-08-19 PROCEDURE — 86141 C-REACTIVE PROTEIN HS: CPT

## 2024-08-19 PROCEDURE — 85652 RBC SED RATE AUTOMATED: CPT

## 2024-08-19 PROCEDURE — 36415 COLL VENOUS BLD VENIPUNCTURE: CPT

## 2024-08-19 PROCEDURE — 80053 COMPREHEN METABOLIC PANEL: CPT

## 2024-11-20 ENCOUNTER — HOSPITAL ENCOUNTER (OUTPATIENT)
Dept: HOSPITAL 15 - LAB | Age: 89
Discharge: HOME | End: 2024-11-20
Attending: NURSE PRACTITIONER
Payer: COMMERCIAL

## 2024-11-20 DIAGNOSIS — I10: Primary | ICD-10-CM

## 2024-11-20 LAB — BUN SERPL-MCNC: 13 MG/DL (ref 9–23)

## 2024-11-20 PROCEDURE — 36415 COLL VENOUS BLD VENIPUNCTURE: CPT

## 2024-11-20 PROCEDURE — 82565 ASSAY OF CREATININE: CPT

## 2024-11-20 PROCEDURE — 84520 ASSAY OF UREA NITROGEN: CPT

## 2024-11-28 ENCOUNTER — HOSPITAL ENCOUNTER (EMERGENCY)
Dept: HOSPITAL 15 - ER | Age: 89
Discharge: HOME | End: 2024-11-28
Payer: COMMERCIAL

## 2024-11-28 VITALS
RESPIRATION RATE: 17 BRPM | TEMPERATURE: 98.8 F | OXYGEN SATURATION: 94 % | HEART RATE: 77 BPM | DIASTOLIC BLOOD PRESSURE: 77 MMHG | SYSTOLIC BLOOD PRESSURE: 134 MMHG

## 2024-11-28 VITALS — BODY MASS INDEX: 25.32 KG/M2 | WEIGHT: 137.57 LBS | HEIGHT: 62 IN

## 2024-11-28 DIAGNOSIS — Z79.899: ICD-10-CM

## 2024-11-28 DIAGNOSIS — Z90.710: ICD-10-CM

## 2024-11-28 DIAGNOSIS — K57.33: Primary | ICD-10-CM

## 2024-11-28 DIAGNOSIS — I10: ICD-10-CM

## 2024-11-28 DIAGNOSIS — E78.5: ICD-10-CM

## 2024-11-28 DIAGNOSIS — F41.9: ICD-10-CM

## 2024-11-28 DIAGNOSIS — Z98.890: ICD-10-CM

## 2024-11-28 LAB
ANION GAP SERPL CALCULATED.3IONS-SCNC: 8 MMOL/L (ref 5–15)
BUN SERPL-MCNC: 10 MG/DL (ref 9–23)
BUN/CREAT SERPL: 7.1 (ref 10–20)
CALCIUM SERPL-MCNC: 10.3 MG/DL (ref 8.7–10.4)
CHLORIDE SERPL-SCNC: 97 MMOL/L (ref 98–107)
CO2 SERPL-SCNC: 27 MMOL/L (ref 20–31)
GLUCOSE SERPL-MCNC: 129 MG/DL (ref 74–106)
HCT VFR BLD AUTO: 41.3 % (ref 36–46)
HGB BLD-MCNC: 14 G/DL (ref 12.2–16.2)
MCH RBC QN AUTO: 31.6 PG (ref 28–32)
MCV RBC AUTO: 93.4 FL (ref 80–100)
NRBC BLD QL AUTO: 0.1 %
POTASSIUM SERPL-SCNC: 4.6 MMOL/L (ref 3.5–5.1)
SODIUM SERPL-SCNC: 132 MMOL/L (ref 136–145)

## 2024-11-28 PROCEDURE — 80048 BASIC METABOLIC PNL TOTAL CA: CPT

## 2024-11-28 PROCEDURE — 74176 CT ABD & PELVIS W/O CONTRAST: CPT

## 2024-11-28 PROCEDURE — 36415 COLL VENOUS BLD VENIPUNCTURE: CPT

## 2024-11-28 PROCEDURE — 85025 COMPLETE CBC W/AUTO DIFF WBC: CPT

## 2024-12-05 ENCOUNTER — HOSPITAL ENCOUNTER (OUTPATIENT)
Dept: HOSPITAL 15 - LAB | Age: 89
Discharge: HOME | End: 2024-12-05
Attending: INTERNAL MEDICINE
Payer: COMMERCIAL

## 2024-12-05 DIAGNOSIS — N18.30: Primary | ICD-10-CM

## 2024-12-05 DIAGNOSIS — Z79.899: ICD-10-CM

## 2024-12-05 DIAGNOSIS — L40.50: ICD-10-CM

## 2024-12-05 LAB
ALBUMIN SERPL-MCNC: 4.3 G/DL (ref 3.2–4.8)
ALP SERPL-CCNC: 85 U/L (ref 46–116)
ALT SERPL-CCNC: 42 U/L (ref 7–40)
ANION GAP SERPL CALCULATED.3IONS-SCNC: 8 MMOL/L (ref 5–15)
BILIRUB SERPL-MCNC: 0.4 MG/DL (ref 0.2–1)
BUN SERPL-MCNC: 11 MG/DL (ref 9–23)
BUN/CREAT SERPL: 6.7 (ref 10–20)
CALCIUM SERPL-MCNC: 10.1 MG/DL (ref 8.7–10.4)
CHLORIDE SERPL-SCNC: 101 MMOL/L (ref 98–107)
CO2 SERPL-SCNC: 27 MMOL/L (ref 20–31)
GLUCOSE SERPL-MCNC: 114 MG/DL (ref 74–106)
HCT VFR BLD AUTO: 37.7 % (ref 36–46)
HGB BLD-MCNC: 13.3 G/DL (ref 12.2–16.2)
MCH RBC QN AUTO: 32.4 PG (ref 28–32)
MCV RBC AUTO: 92 FL (ref 80–100)
NRBC BLD QL AUTO: 0.1 %
POTASSIUM SERPL-SCNC: 4.7 MMOL/L (ref 3.5–5.1)
PROT SERPL-MCNC: 6.8 G/DL (ref 5.7–8.2)
SODIUM SERPL-SCNC: 136 MMOL/L (ref 136–145)

## 2024-12-05 PROCEDURE — 36415 COLL VENOUS BLD VENIPUNCTURE: CPT

## 2024-12-05 PROCEDURE — 85652 RBC SED RATE AUTOMATED: CPT

## 2024-12-05 PROCEDURE — 86141 C-REACTIVE PROTEIN HS: CPT

## 2024-12-05 PROCEDURE — 85025 COMPLETE CBC W/AUTO DIFF WBC: CPT

## 2024-12-05 PROCEDURE — 80053 COMPREHEN METABOLIC PANEL: CPT

## 2025-02-06 ENCOUNTER — HOSPITAL ENCOUNTER (INPATIENT)
Dept: HOSPITAL 15 - ER | Age: OVER 89
LOS: 2 days | Discharge: LEFT BEFORE DISCHARGE | DRG: 102 | End: 2025-02-08
Admitting: NURSE PRACTITIONER
Payer: COMMERCIAL

## 2025-02-06 VITALS — BODY MASS INDEX: 24.97 KG/M2 | WEIGHT: 132.28 LBS | HEIGHT: 61 IN

## 2025-02-06 DIAGNOSIS — E87.1: ICD-10-CM

## 2025-02-06 DIAGNOSIS — G44.209: Primary | ICD-10-CM

## 2025-02-06 DIAGNOSIS — Z20.822: ICD-10-CM

## 2025-02-06 DIAGNOSIS — Z79.899: ICD-10-CM

## 2025-02-06 DIAGNOSIS — Z53.29: ICD-10-CM

## 2025-02-06 DIAGNOSIS — N18.9: ICD-10-CM

## 2025-02-06 DIAGNOSIS — Z90.710: ICD-10-CM

## 2025-02-06 DIAGNOSIS — M54.2: ICD-10-CM

## 2025-02-06 DIAGNOSIS — R13.10: ICD-10-CM

## 2025-02-06 DIAGNOSIS — M43.02: ICD-10-CM

## 2025-02-06 DIAGNOSIS — G50.0: ICD-10-CM

## 2025-02-06 DIAGNOSIS — I12.9: ICD-10-CM

## 2025-02-06 DIAGNOSIS — N17.0: ICD-10-CM

## 2025-02-06 DIAGNOSIS — G89.29: ICD-10-CM

## 2025-02-06 DIAGNOSIS — F41.9: ICD-10-CM

## 2025-02-06 DIAGNOSIS — E78.5: ICD-10-CM

## 2025-02-06 DIAGNOSIS — D72.829: ICD-10-CM

## 2025-02-06 DIAGNOSIS — Z86.73: ICD-10-CM

## 2025-02-06 LAB
ALBUMIN SERPL-MCNC: 4.7 G/DL (ref 3.2–4.8)
ALP SERPL-CCNC: 90 U/L (ref 46–116)
ALT SERPL-CCNC: 10 U/L (ref 7–40)
ANION GAP SERPL CALCULATED.3IONS-SCNC: 8 MMOL/L (ref 5–15)
BILIRUB SERPL-MCNC: 0.8 MG/DL (ref 0.2–1)
BUN SERPL-MCNC: 15 MG/DL (ref 9–23)
BUN/CREAT SERPL: 10.1 (ref 10–20)
CALCIUM SERPL-MCNC: 9.8 MG/DL (ref 8.7–10.4)
CHLORIDE SERPL-SCNC: 96 MMOL/L (ref 98–107)
CO2 SERPL-SCNC: 25 MMOL/L (ref 20–31)
GLUCOSE SERPL-MCNC: 164 MG/DL (ref 74–106)
HCT VFR BLD AUTO: 42.4 % (ref 36–46)
HGB BLD-MCNC: 14.5 G/DL (ref 12.2–16.2)
MCH RBC QN AUTO: 31.5 PG (ref 28–32)
MCV RBC AUTO: 92.4 FL (ref 80–100)
NRBC BLD QL AUTO: 0.1 %
POTASSIUM SERPL-SCNC: 4.6 MMOL/L (ref 3.5–5.1)
PROT SERPL-MCNC: 7.3 G/DL (ref 5.7–8.2)
SARS-COV+SARS-COV-2 AG RESP QL IA.RAPID: NEGATIVE
SODIUM SERPL-SCNC: 129 MMOL/L (ref 136–145)

## 2025-02-06 PROCEDURE — 96365 THER/PROPH/DIAG IV INF INIT: CPT

## 2025-02-06 PROCEDURE — 96361 HYDRATE IV INFUSION ADD-ON: CPT

## 2025-02-06 PROCEDURE — 71045 X-RAY EXAM CHEST 1 VIEW: CPT

## 2025-02-06 PROCEDURE — 36415 COLL VENOUS BLD VENIPUNCTURE: CPT

## 2025-02-06 PROCEDURE — 81001 URINALYSIS AUTO W/SCOPE: CPT

## 2025-02-06 PROCEDURE — 85025 COMPLETE CBC W/AUTO DIFF WBC: CPT

## 2025-02-06 PROCEDURE — 80053 COMPREHEN METABOLIC PANEL: CPT

## 2025-02-06 PROCEDURE — 70491 CT SOFT TISSUE NECK W/DYE: CPT

## 2025-02-06 PROCEDURE — 87804 INFLUENZA ASSAY W/OPTIC: CPT

## 2025-02-06 PROCEDURE — 83605 ASSAY OF LACTIC ACID: CPT

## 2025-02-06 PROCEDURE — 84100 ASSAY OF PHOSPHORUS: CPT

## 2025-02-06 PROCEDURE — 82962 GLUCOSE BLOOD TEST: CPT

## 2025-02-06 PROCEDURE — 93005 ELECTROCARDIOGRAM TRACING: CPT

## 2025-02-06 PROCEDURE — 87426 SARSCOV CORONAVIRUS AG IA: CPT

## 2025-02-06 PROCEDURE — 83735 ASSAY OF MAGNESIUM: CPT

## 2025-02-06 PROCEDURE — 80048 BASIC METABOLIC PNL TOTAL CA: CPT

## 2025-02-06 PROCEDURE — 70450 CT HEAD/BRAIN W/O DYE: CPT

## 2025-02-06 PROCEDURE — 87040 BLOOD CULTURE FOR BACTERIA: CPT

## 2025-02-06 PROCEDURE — 84443 ASSAY THYROID STIM HORMONE: CPT

## 2025-02-06 RX ADMIN — CEFTRIAXONE SODIUM ONE MLS/HR: 1 INJECTION, POWDER, FOR SOLUTION INTRAMUSCULAR; INTRAVENOUS at 21:52

## 2025-02-06 RX ADMIN — SODIUM CHLORIDE ONE MLS/HR: 0.9 INJECTION, SOLUTION INTRAVENOUS at 20:55

## 2025-02-06 NOTE — DVHHP2
History of Present Illness


Reason for Visit:  Headache


History of Present Illness


The patient is a 89-year-old female with past medical history of CVA, anxiety, 

hyperlipidemia, and hypertension who presented to [facility] ED with

complaint of headaches. Patient reports, she has been seen by neurologist last 

week for same symptoms and not given prednisone with no relief of symptoms, 

experiencing occipital headache, constant, associated with dizziness, blurry 

vision, getting worse that prompted this visit. Patient was seen and evaluated 

in the ED, laboratory data shows WBC 20.2, platelets 488, sodium 129, potassium 

4.6, BUN 15, creatinine 1.49, GFR 33, glucose 164, TSH 1.80, blood pressure 

143/87, heart rate 107, temperature 97.6 F, O2 saturation 96% on room air. Head

CT showed no acute/new intracranial abnormality. Please see medication orders 

section in the computer.  On my assessment, patient denied chest pain, no 

dizziness, no diaphoresis, no shortness breath, no nausea, no vomiting, fever, 

no chills. Patient was admitted for further evaluation and medical management.





Past Medical History


Anxiety, CVA, High Lipids, HTN


Past Surgical History


, Hysterectomy


Family History


Reviewed, noncontributory to the management of this case.


Past Social History


The patient lives at home, denies smoking, alcohol or illicit drugs abuse.





Review of Systems


Constitutional:  Yes: Weakness; No: Fever, Chills, Sweats, Malaise, Other


Eyes:  No: Pain, Vision change, Conjunctivae inflammation, Eyelid inflammation, 

Other, Redness


ENT:  No: Ear pain, Ear discharge, Nose pain, Nose discharge, Nose congestion, 

Mouth pain, Mouth swelling, Throat pain, Throat swelling, Other


Respiratory:  No: Cough, Dry, Shortness of breath, SOB with excertion, Wheezing,

Hemoptysis, Pleuritic Pain, Sputum, Wheezing, Other


Cardiovascular:  No: Chest Pain, Palpitations, Orthopnea, Paroxysmal Noc. 

Dyspnea, Edema, Lt Headedness, Other


Gastrointestinal:  No: Nausea, Vomiting, Abdominal Pain, Diarrhea, Constipation,

Melena, Hematochezia, Other


Genitourinary:  No Dysuria, No Frequency, No Incontinence, No Hematuria, No 

Retention, No Other


Musculoskeletal:  No: other, neck pain, shoulder pain, arm pain, back pain, hand

pain, leg pain, foot pain


Skin:  No: Rash, Lesions, Jaundice, Bruising, Other


Neurological:  Other (Dizziness, headache.); No: Weakness, Numbness, 

Incoordination, Change in speech, Confusion, Seizures


Allergies:  


Coded Allergies:  


     NO KNOWN ALLERGIES (Unverified , 22)





Exam


Vital Signs





Vital Signs








  Date Time  Temp Pulse Resp B/P (MAP) Pulse Ox O2 Delivery O2 Flow Rate FiO2


 


25 22:49 97.5 107 16 143/87 (105) 96   





 97.5       








General Appearance:  Alert, Oriented X3, Cooperative, No acute distress


HEENT:  Atraumatic, PERRLA, EOMI, Mucous membr. moist/pink


Respiratory:  Clear to auscultation, Normal air movement


Cardiovascular:  Regular rate, Normal S1, Normal S2, No murmurs


Abdominal:  Normal bowel sounds, Soft, No tenderness, No hepatospenomegaly, No 

masses


Extremities:  No clubbing, No cyanosis, No edema, Normal pulses, No 

tenderness/swelling


Skin:  No rashes, No breakdown, No significant lesion


Neuro:  Normal speech, Normal tone, Sensation intact, Cranial nerves 3-12 NL, 

Reflexes 2+, Other (Generalized weakness)


Psych/Mental Status:  Mental status NL, Mood NL





Labs/Xrays





                                      Labs








Test


 25


21:59 25


21:50 25


20:48 25


19:36 Range/Units


 


 


Lactic Acid Level 1.2     0.4-2.0  mmol/L


 


Influenza Type A Antigen  Negative    Negative  


 


Influenza Type B Antigen  Negative    Negative  


 


SARS-CoV-2 Antigen (Rapid)  Negative    NEGATIVE  


 


White Blood Count


 


 


 20.2 H


 


 4.4-10.8


10^3/uL


 


Red Blood Count


 


 


 4.59 


 


 4.0-5.20


10^6/uL


 


Hemoglobin   14.5   12.2-16.2  g/dL


 


Hematocrit   42.4   36.0-46.0  %


 


Mean Corpuscular Volume   92.4   80.0-100.0  fL


 


Mean Corpuscular Hemoglobin   31.5   28.0-32.0  pg


 


Mean Corpuscular Hemoglobin


Concent 


 


 34.1 


 


 32.0-36.0  g/dL





 


Red Cell Distribution Width   13.2   11.8-14.3  %


 


Platelet Count


 


 


 488 H


 


 140-450


10^3/uL


 


Mean Platelet Volume   7.7   6.9-10.8  fL


 


Neutrophils (%) (Auto)   80.6 H  37.0-80.0  %


 


Lymphocytes (%) (Auto)   10.1   10.0-50.0  %


 


Monocytes (%) (Auto)   8.3   0.0-12.0  %


 


Eosinophils (%) (Auto)   0.5   0.0-7.0  %


 


Basophils (%) (Auto)   0.5   0.0-2.0  %


 


Neutrophils # (Auto)


 


 


 16.3 H


 


 1.6-8.6  10


^3/uL


 


Lymphocytes # (Auto)


 


 


 2.0 


 


 0.4-5.4  10


^3/uL


 


Monocytes # (Auto)   1.7 H  0-1.3  10 ^3/uL


 


Eosinophils # (Auto)   0.1   0-0.8  10 ^3/uL


 


Basophils # (Auto)   0.1   0-0.2  10 ^3/uL


 


Nucleated Red Blood Cells   0.1    %


 


Sodium Level   129 L  136-145  mmol/L


 


Potassium Level   4.6   3.5-5.1  mmol/L


 


Chloride Level   96 L    mmol/L


 


Carbon Dioxide Level   25   20-31  mmol/L


 


Anion Gap   8   5-15  


 


Blood Urea Nitrogen   15   9-23  mg/dL


 


Creatinine


 


 


 1.49 H


 


 0.550-1.02


mg/dL


 


Glomerular Filtration Rate


Calc 


 


 33 


 


 >90  mL/min





 


BUN/Creatinine Ratio   10.1   10.0-20.0  


 


Serum Glucose   164 H    mg/dL


 


Calcium Level   9.8   8.7-10.4  mg/dL


 


Total Bilirubin   0.8   0.2-1.0  mg/dL


 


Aspartate Amino Transferase


(AST) 


 


 14 


 


 13-40  U/L





 


Alanine Aminotransferase (ALT)   10   7-40  U/L


 


Alkaline Phosphatase   90     U/L


 


Total Protein   7.3   5.7-8.2  g/dL


 


Albumin   4.7   3.2-4.8  g/dL


 


Thyroid Stimulating Hormone


(TSH) 


 


 1.80 


 


 0.55-4.78


uIU/mL


 


POC Glucose    154 H   mg/dl








PATIENT: LETI MARSHALL     ACCT: [id number]        UNIT: [id number]


: 1935           LOC: ER                   ROOM / BED:  / 


AGE / SEX: 89 / F         ADM STATUS: REG ER        SERVICE DT: 25


ORDERING PHYSICIAN: BRIANNA AYOUB MD


PROCEDURE(s): HWOCT - HEAD WITHOUT CONTRAST


REASON: Headache worsening change of symptoms


ORDER NUMBER(s): [id number], ACCESSION NUMBER(s): [id number]





CT HEAD WITHOUT CONTRAST


INDICATION: Headache worsening change of symptoms


COMPARISON: CT STROKE CTH on DOS: 24, HEAD WITHOUT CONTRAST on DOS: 20


TECHNIQUE: CT of the head without intravenous contrast.


RADIATION DOSE: CTDIvol:  mGy, DLP:  mGy*cm





FINDINGS: 


There is no evidence of intracranial hemorrhage, acute infarct, extra-axial 

collection, mass effect, midline shift, herniation or hydrocephalus. There is a 

small old infarct in the left frontal lobe and very small old infarcts in 

bilateral cerebellum which have been present on prior studies dating back to at 

least 2020. Mild chronic white matter microvascular ischemic change. 

Mild cerebral volume loss. 


Visualized paranasal sinuses and mastoid air cells are clear. Soft tissues and 

osseous structures are unremarkable.





IMPRESSION: 


No acute/new intracranial abnormality identified.





ORDERING PHYSICIAN: BRIANNA AYOUB MD


PROCEDURE(s): CXR1 - CHEST XRAY 1 VIEW


REASON: Suspected sepsis


ORDER NUMBER(s): [id number], ACCESSION NUMBER(s): [id number]





EXAM: XY CHEST XRAY 1 VIEW


CLINICAL HISTORY: Suspected sepsis


TECHNIQUE: Single AP view of the chest


WID: 


COMPARISON: XY CHEST XRAY 1 VIEW on DOS: 24





FINDINGS: 


Lines and tubes: Loop recorder device projects over the left lower chest.


Chest: 


The heart size and pulmonary vasculature is within normal limits. Calcified 

plaque projects over the aortic arch


No pleural effusion, pneumothorax, or consolidation. Linear bibasilar scarring 

or atelectasis.


The osseous structures are grossly intact.





IMPRESSION: 


No acute cardiopulmonary abnormality.





Assessment/Plan


Assessment/Plan


Headache


Sinus tachycardia


Syncope


Hyponatremia


JESUS (acute kidney injury)


Leukocytosis, unspecified


Generalized weakness





Plan


1.  Admit to telemetry unit


2.  Breathing treatment


3.  Pain control management


4.  IV antibiotic management


5.  Management of fluids and electrolytes


6.  Consultation for hospitalist


7.  Diagnostic test head CT


8.  DVT prophylaxis on SCDs


9.  Repeat labs CBC, CMP in a.m.


10. Home medication reviewed and reconciled


11. Continue with current medical management


12. Treatment plan discussed with patient and RN. Patient verbalized 

understanding.


Plan discussed with:  Patient, Other (RN)


My Orders





                          Orders - CHARMAINE BOSWELL DNP








Procedure Category Date Status





  Time 


 


Blood Culture IRVING 25 Verified





  23:12 


 


Carvedilol Tablet PHA 25 Verified





 (Coreg Tablet)  10:00 


 


Amlodipine Tablet PHA 25 Verified





 (Norvasc Tablet)  10:00 


 


Atorvastatin (Lipitor) PHA 25 Verified





  22:00 








Problem List:  


(1) Headache


(2) Leukocytosis, unspecified


(3) Syncope


(4) Hyponatremia


(5) Sinus tachycardia


(6) JESUS (acute kidney injury)


(7) Generalized weakness





Date of Service:  2025


Billing Provider:  CHARMAINE BOSWELL DNP


Common Visit Codes:  99223-INITIAL  INP/OBS CARE (HIGH)











CHARMAINE BOSWELL DNP             2025 23:18

## 2025-02-06 NOTE — DVH
CT HEAD WITHOUT CONTRAST



INDICATION: Headache worsening change of symptoms



COMPARISON: CT STROKE CTH on DOS: 4/22/24, HEAD WITHOUT CONTRAST on DOS: 2/13/20



TECHNIQUE: CT of the head without intravenous contrast.



RADIATION DOSE: CTDIvol:  mGy, DLP:  mGy*cm



FINDINGS: 



There is no evidence of intracranial hemorrhage, acute infarct, extra-axial collection, mass effect, 
midline shift, herniation or hydrocephalus. There is a small old infarct in the left frontal lobe and
 very small old infarcts in bilateral cerebellum which have been present on prior studies dating back
 to at least February 2020. Mild chronic white matter microvascular ischemic change. Mild cerebral vo
lume loss. 



Visualized paranasal sinuses and mastoid air cells are clear.  Soft tissues and osseous structures ar
e unremarkable.



IMPRESSION: 



No acute/new intracranial abnormality identified.



Electronically Signed by: Elmo Sethi at 02/06/2025 20:37:20 PM

## 2025-02-06 NOTE — DVH
EXAM: XY CHEST XRAY 1 VIEW



CLINICAL HISTORY: Suspected sepsis



TECHNIQUE: Single AP view of the chest



WID: 



COMPARISON: XY CHEST XRAY 1 VIEW on DOS: 4/22/24



FINDINGS: 



Lines and tubes: Loop recorder device projects over the left lower chest.



Chest: 



The heart size and pulmonary vasculature is within normal limits. Calcified plaque projects over the 
aortic arch



No pleural effusion, pneumothorax, or consolidation. Linear bibasilar scarring or atelectasis.



The osseous structures are grossly intact.



IMPRESSION: 



No acute cardiopulmonary abnormality.



Electronically Signed by: Jyoti Amaral at 02/06/2025 23:10:08 PM

## 2025-02-06 NOTE — ED.PDOC
History of Present Illness


HPI Comments


89-year-old female who came to ER due to headaches.  Patient does have history 

of hypertension and CVA. Has been having episodes of headaches recently, was 

seen by her neurologist last week and was given prednisone. Patient has been 

having headaches again yesterday, occipital, constant associated with dizziness 

and blurring of visions. Noted elevated blood pressure at 170/93 mmHg.  Patient 

coming in today still complaining of headaches.


Chief Complaint:  Headache


Time Seen by MD:  20:04


Primary Care Provider:  LESS


Reviewed Notes:  Nurses Notes


Allergies:  


Coded Allergies:  


     NO KNOWN ALLERGIES (Unverified , 22)


Home Meds


Active Scripts


Metronidazole (Flagyl) 500 Mg Tab, 1 TAB PO BID, #14 TAB


   Prov:PIYUSH GARIBAY MD         24


Apixaban Base (ELIQUIS) 5 Mg Tab, 5 MG PO BID for 30 Days, #60 TAB


   Prov:TITO PRASAD MD         20


Reported Medications


Cholecalciferol (VITAMIN D3) 2,000 Unit Tab, 1 TAB PO DAILY for supplement


   3/23/23


Magnesium Oxide (MAGNESIUM OXIDE) 400 Mg Tab, 1 TAB PO DAILY for supplement


   3/23/23


Calcium Acetate (Phosphate Bin (Calcium Acetate) 667 Mg Cap, 667 MG PO BID for 

bone health


   3/23/23


Folic Acid (Folic Acid) 1 Mg Tab, 1 MG PO DAILY for supplement for 30 Days, MG


   3/23/23


Meclizine Hcl (Meclizine Hcl) 25 Mg Tab, 25 MG PO BIDP PRN for DIZZINESS for 30 

Days, MG


   3/23/23


Potassium Chloride (Potassium Chloride ER) 20 Meq Tab, 20 MEQ PO HS for muscle 

cramps, TAB


   3/23/23


Lorazepam (Lorazepam) 0.5 Mg Tab, 0.5 MG PO HS for sleep/anxiety, TAB


   3/23/23


Trazodone Hcl (Trazodone Hcl) 100 Mg Tab, 100 MG PO HS PRN for FOR INSOMNIA, MG


   3/23/23


Atorvastatin Calcium (ATORVASTATIN CALCIUM) 40 Mg Tab, 1 TAB PO DAILY for 

cholesterol


   3/23/23


Metoprolol Succinate (Metoprolol Succinate Er) 50 Mg Tab, 50 MG PO DAILY for htn

for 30 Days, MG


   3/23/23


Amlodipine Besylate (Amlodipine Besylate) 5 Mg Tab, 5 MG PO DAILY for htn


   3/23/23


Methotrexate (Methotrexate) 2.5 Mg Tab, 2.5 MG PO 3 tabs/week for psoriatic 

arthritis


   3/23/23


Losartan Potassium (Losartan Potassium) 50 Mg Tab, 50 MG PO DAILY for htn


   10/27/17


Information Source:  Patient


Mode of Arrival:  Ambulatory


Severity:  Moderate


Timing:  Hours


Duration:  Since onset


Review of Systems


REVIEW OF SYSTEMS: No fever, no chills, or fatigue


HEENT: No sore throat, no earache, no congestion,  no neck pain. 


Cardiac: No chest pain. No palpitations. 


Lungs: No shortness of breath, no cough. 


GI: No nausea, no vomiting, no diarrhea, no constipation, no abdominal pain


: No dysuria, frequency, or urgency. No hematuria. 


Musculoskeletal: No joint pain , no joint swelling, no extremity edema. 


Skin: No rash, no itching. 


Neuro:  (+) headache, (+) dizziness, no weakness


Vital Signs





                                   Vital Signs








  Date Time  Temp Pulse Resp B/P (MAP) Pulse Ox O2 Delivery O2 Flow Rate FiO2


 


25 22:49 97.5 107 16 143/87 (105) 96   





 97.5       








Physical Exam


General: Awake, alert and oriented. No acute distress. 





Skin: Skin in warm, dry and intact. Appropriate color for ethnicity. Nailbeds 

pink with no cyanosis.





HEENT: The head is normocephalic and atraumatic. Conjunctivae are clear without 

exudates or hemorrhage. Sclera is non-icteric. EOM are intact. No signs of 

nystagmus. Eyelids are normal in appearance without swelling or lesions. Oral 

mucosa is pink and moist





Neck: The neck is supple with normal range of motion. No JVD.





Cardiac:  Heart rate and rhythm are normal. No murmurs, gallops, or rubs are a

uscultated. 





Respiratory: No signs of respiratory distress. Lung sounds are clear in all 

lobes bilaterally without rales, ronchi, or wheezes.





Abdominal: Abdomen is soft, non-tender without distention. Bowel sounds are 

present and normoactive in all four quadrants. 





Extremities: Upper and lower extremities are atraumatic in appearance without 

deformity or edema.





Neurological: The patient is awake, alert and oriented to person, place, and 

time with normal speech. Speech is clear. There is no facial asymmetry. 





Psychiatric: Appropriate mood and affect. Good judgement and insight. No visual 

or auditory hallucinations.





Past Medical History


PAST MEDICAL HISTORY:  Anxiety, CVA, High Lipids, HTN


Surgical History:  , Hysterectomy


GYN History:  No Pertinent GYN History





Family History


Family History:  Family hx of Cancer





Social History


Smoker:  Non-Smoker


Alcohol:  Denies ETOH Use


Drugs:  Denies Drug Use


Lives In:  Home





Was a procedure done?


Was a procedure done?:  No





EKG


EKG :  


   Pulse Rate (adult):  123


   Cardiac Rhythm:  ST


   Hypertrophy:  LAE


   ST:  Old, Inf, Infarct





Differential Dx


Considerations may include:


Hypertensive urgency, headaches, CVA, TIA, electrolyte imbalance





X-Ray, Labs, Meds, VS





                                   Vital Signs








  Date Time  Temp Pulse Resp B/P (MAP) Pulse Ox O2 Delivery O2 Flow Rate FiO2


 


25 22:49 97.5 107 16 143/87 (105) 96   





 97.5       


 


25 21:43  123      


 


25 21:24  120 20 106/82 (90) 98   


 


25 20:00  123      


 


25 19:43 99.2 125 18 110/64 (79) 96   








                                       Lab








Test


 25


21:59 25


21:50 25


20:48 25


19:36 Range/Units


 


 


Lactic Acid Level 1.2     0.4-2.0  mmol/L


 


Influenza Type A Antigen  Negative    Negative  


 


Influenza Type B Antigen  Negative    Negative  


 


SARS-CoV-2 Antigen (Rapid)  Negative    NEGATIVE  


 


White Blood Count


 


 


 20.2 H


 


 4.4-10.8


10^3/uL


 


Red Blood Count


 


 


 4.59 


 


 4.0-5.20


10^6/uL


 


Hemoglobin   14.5   12.2-16.2  g/dL


 


Hematocrit   42.4   36.0-46.0  %


 


Mean Corpuscular Volume   92.4   80.0-100.0  fL


 


Mean Corpuscular Hemoglobin   31.5   28.0-32.0  pg


 


Mean Corpuscular Hemoglobin


Concent 


 


 34.1 


 


 32.0-36.0  g/dL





 


Red Cell Distribution Width   13.2   11.8-14.3  %


 


Platelet Count


 


 


 488 H


 


 140-450


10^3/uL


 


Mean Platelet Volume   7.7   6.9-10.8  fL


 


Neutrophils (%) (Auto)   80.6 H  37.0-80.0  %


 


Lymphocytes (%) (Auto)   10.1   10.0-50.0  %


 


Monocytes (%) (Auto)   8.3   0.0-12.0  %


 


Eosinophils (%) (Auto)   0.5   0.0-7.0  %


 


Basophils (%) (Auto)   0.5   0.0-2.0  %


 


Neutrophils # (Auto)


 


 


 16.3 H


 


 1.6-8.6  10


^3/uL


 


Lymphocytes # (Auto)


 


 


 2.0 


 


 0.4-5.4  10


^3/uL


 


Monocytes # (Auto)   1.7 H  0-1.3  10 ^3/uL


 


Eosinophils # (Auto)   0.1   0-0.8  10 ^3/uL


 


Basophils # (Auto)   0.1   0-0.2  10 ^3/uL


 


Nucleated Red Blood Cells   0.1    %


 


Sodium Level   129 L  136-145  mmol/L


 


Potassium Level   4.6   3.5-5.1  mmol/L


 


Chloride Level   96 L    mmol/L


 


Carbon Dioxide Level   25   20-31  mmol/L


 


Anion Gap   8   5-15  


 


Blood Urea Nitrogen   15   9-23  mg/dL


 


Creatinine


 


 


 1.49 H


 


 0.550-1.02


mg/dL


 


Glomerular Filtration Rate


Calc 


 


 33 


 


 >90  mL/min





 


BUN/Creatinine Ratio   10.1   10.0-20.0  


 


Serum Glucose   164 H    mg/dL


 


Calcium Level   9.8   8.7-10.4  mg/dL


 


Total Bilirubin   0.8   0.2-1.0  mg/dL


 


Aspartate Amino Transferase


(AST) 


 


 14 


 


 13-40  U/L





 


Alanine Aminotransferase (ALT)   10   7-40  U/L


 


Alkaline Phosphatase   90     U/L


 


Total Protein   7.3   5.7-8.2  g/dL


 


Albumin   4.7   3.2-4.8  g/dL


 


Thyroid Stimulating Hormone


(TSH) 


 


 1.80 


 


 0.55-4.78


uIU/mL


 


POC Glucose    154 H   mg/dl








                               Current Medications








 Medications


  (Trade)  Dose


 Ordered  Sig/Clifford


 Route  Start Time


 Stop Time Status Last Admin


 


 Sodium Chloride  1,000 ml @ 


 1,000 mls/hr  Q1H ONCE


 IV  25 20:15


 25 21:14 DC 25 20:55





 


 Ceftriaxone Sodium  50 ml @ 


 100 mls/hr  ONCE  ONCE


 IV  25 21:45


 25 22:14 DC 25 21:52








CT HEAD WITHOUT CONTRAST





INDICATION: Headache worsening change of symptoms


COMPARISON: CT STROKE CTH on DOS: 24, HEAD WITHOUT CONTRAST on DOS: 20


TECHNIQUE: CT of the head without intravenous contrast.


RADIATION DOSE: CTDIvol:  mGy, DLP:  mGy*cm





FINDINGS: 


There is no evidence of intracranial hemorrhage, acute infarct, extra-axial 

collection, mass effect, midline shift, herniation or hydrocephalus. There is a 

small old infarct in the left frontal lobe and very small old infarcts in 

bilateral cerebellum which have been present on prior studies dating back to at 

least 2020. Mild chronic white matter microvascular ischemic change. 

Mild cerebral volume loss. 


Visualized paranasal sinuses and mastoid air cells are clear.  Soft tissues and 

osseous structures are unremarkable.





IMPRESSION: 


No acute/new intracranial abnormality identified.


Time of 1ST Reevaluation:  20:00


Reevaluation 1ST:  Unchanged


Patient Education/Counseling:  Diagnosis, Treatment


Family Education/Counseling:  Diagnosis, Treatment





Departure 1


Departure


Time of Disposition:  01:21


Impression:  


   Primary Impression:  


   Headache


   Additional Impressions:  


   Sinus tachycardia


   Syncope


   Hyponatremia


   JESUS (acute kidney injury)


Disposition:   ADMITTED AS INPATIENT


Condition:  Stable


Comments


89-year-old female who presented to the emergency department with headache.  She

was noted to be tachycardic with soft blood pressure of 110/64.  Patient had a 

syncopal episode while ambulating to the bathroom which was witnessed by nurse. 

She was helped into a chair.  No fall or injury.  Leukocytosis noted, patient 

has been on prednisone recently.




















----------------------------------------------


-----------


Extensive evaluation was performed in attempt to identify or rule out:  (See 

differential diagnosis section)





The following tests were ordered, and results were reviewed by me:  (See 

diagnostic results section)





The following test were independently interpreted by me:  EKG





I reviewed and agreed with the following test results read by other providers:  

CT head





I reviewed the following notes from the pt's past medical encounters:  N/A





Additional information was gathered from interviewing the following independent 

historians:  Patient's daughter at bedside





Discussion of management or test interpretation with external physician/other 

qualified health care professional: N/A





Addressed an acute or chronic illness that poses a threat to life or bodily func

tion:  Syncope, sinus tachycardia, hypotension, hyponatremia





Decision regarding hospitalization or escalation of hospital level of care: Risk

and benefits of admission for further treatment of patient's condition was 

considered. Due to patient's current clinical  condition, high risk of decline 

and poor outcome if discharged and need for further inpatient management and 

monitoring, patient will be admitted to the hospital. 





Drug therapy requiring intensive monitoring for toxicity: N/A 


Parenteral controlled substances: N/A


Decision regarding elective major surgery with identified patient or procedure 

risk factors: N/A


Decision regarding emergency major surgery: N/A


Decision not to resuscitate or to de-escalate care because of poor prognosis: 

N/A


Diagnosis or treatment significantly limited by social determinants of health: 

N/A





Critical Care Note


Critical Care Time?:  No





Stability


Stability form required:  No





Heart Score


Heart Score:  








Heart Score Response (Comments) Value


 


History N/A 0


 


EKG N/A 0


 


Age N/A 0


 


Risk Factors N/A 0


 


Troponin N/A 0


 


Total  0














I personally scribed for BRIANNA AYOUB MD ([email or web address]) on 25 at 20:04.  

Electronically submitted by Abdiaziz Gilmore ([employer]).


I personally scribed for BRIANNA AYOUB MD ([email or web address]) on 25 at 20:48.  

Electronically submitted by Abdiaziz Gilmore ([employer]).


I personally scribed for BRIANNA AYOUB MD ([email or web address]) on 25 at 21:43.  

Electronically submitted by Abdiaziz Gilmore ([employer]).





BRIANNA AYOUB MD             2025 20:04

## 2025-02-07 VITALS
HEART RATE: 89 BPM | DIASTOLIC BLOOD PRESSURE: 83 MMHG | RESPIRATION RATE: 19 BRPM | SYSTOLIC BLOOD PRESSURE: 151 MMHG | OXYGEN SATURATION: 95 % | TEMPERATURE: 98 F

## 2025-02-07 VITALS
DIASTOLIC BLOOD PRESSURE: 67 MMHG | OXYGEN SATURATION: 93 % | RESPIRATION RATE: 20 BRPM | SYSTOLIC BLOOD PRESSURE: 138 MMHG | HEART RATE: 85 BPM

## 2025-02-07 VITALS
DIASTOLIC BLOOD PRESSURE: 83 MMHG | HEART RATE: 91 BPM | SYSTOLIC BLOOD PRESSURE: 135 MMHG | RESPIRATION RATE: 18 BRPM | OXYGEN SATURATION: 93 % | TEMPERATURE: 97.9 F

## 2025-02-07 VITALS
SYSTOLIC BLOOD PRESSURE: 134 MMHG | DIASTOLIC BLOOD PRESSURE: 66 MMHG | TEMPERATURE: 98 F | RESPIRATION RATE: 17 BRPM | HEART RATE: 93 BPM | OXYGEN SATURATION: 93 %

## 2025-02-07 VITALS
DIASTOLIC BLOOD PRESSURE: 81 MMHG | RESPIRATION RATE: 17 BRPM | HEART RATE: 85 BPM | TEMPERATURE: 98.5 F | OXYGEN SATURATION: 94 % | SYSTOLIC BLOOD PRESSURE: 156 MMHG

## 2025-02-07 VITALS — HEART RATE: 91 BPM | OXYGEN SATURATION: 93 % | RESPIRATION RATE: 18 BRPM

## 2025-02-07 VITALS
HEART RATE: 78 BPM | SYSTOLIC BLOOD PRESSURE: 144 MMHG | DIASTOLIC BLOOD PRESSURE: 78 MMHG | TEMPERATURE: 99 F | OXYGEN SATURATION: 94 % | RESPIRATION RATE: 18 BRPM

## 2025-02-07 VITALS — RESPIRATION RATE: 18 BRPM | HEART RATE: 81 BPM | OXYGEN SATURATION: 93 %

## 2025-02-07 VITALS — HEART RATE: 82 BPM

## 2025-02-07 LAB
ALBUMIN SERPL-MCNC: 3.8 G/DL (ref 3.2–4.8)
ALP SERPL-CCNC: 71 U/L (ref 46–116)
ALT SERPL-CCNC: < 9 U/L (ref 7–40)
ANION GAP SERPL CALCULATED.3IONS-SCNC: 8 MMOL/L (ref 5–15)
BILIRUB SERPL-MCNC: 0.5 MG/DL (ref 0.2–1)
BUN SERPL-MCNC: 16 MG/DL (ref 9–23)
BUN/CREAT SERPL: 13.9 (ref 10–20)
CALCIUM SERPL-MCNC: 8.9 MG/DL (ref 8.7–10.4)
CHLORIDE SERPL-SCNC: 101 MMOL/L (ref 98–107)
CO2 SERPL-SCNC: 24 MMOL/L (ref 20–31)
GLUCOSE SERPL-MCNC: 119 MG/DL (ref 74–106)
HCT VFR BLD AUTO: 33.9 % (ref 36–46)
HGB BLD-MCNC: 11.5 G/DL (ref 12.2–16.2)
MCH RBC QN AUTO: 31.5 PG (ref 28–32)
MCV RBC AUTO: 92.4 FL (ref 80–100)
NRBC BLD QL AUTO: 0 %
POTASSIUM SERPL-SCNC: 4 MMOL/L (ref 3.5–5.1)
PROT SERPL-MCNC: 5.8 G/DL (ref 5.7–8.2)
SODIUM SERPL-SCNC: 133 MMOL/L (ref 136–145)

## 2025-02-07 RX ADMIN — CEFTRIAXONE SODIUM SCH MLS/HR: 1 INJECTION, POWDER, FOR SOLUTION INTRAMUSCULAR; INTRAVENOUS at 20:55

## 2025-02-07 RX ADMIN — CARVEDILOL SCH MG: 12.5 TABLET, FILM COATED ORAL at 09:55

## 2025-02-07 RX ADMIN — SODIUM CHLORIDE SCH MLS/HR: 0.9 INJECTION, SOLUTION INTRAVENOUS at 01:18

## 2025-02-07 RX ADMIN — NYSTATIN SCH ML: 500000 SUSPENSION ORAL at 22:00

## 2025-02-07 RX ADMIN — LORAZEPAM ONE MG: 2 INJECTION, SOLUTION INTRAMUSCULAR; INTRAVENOUS at 12:55

## 2025-02-07 RX ADMIN — APIXABAN SCH MG: 5 TABLET, FILM COATED ORAL at 09:56

## 2025-02-07 RX ADMIN — ATORVASTATIN CALCIUM SCH MG: 20 TABLET, FILM COATED ORAL at 20:53

## 2025-02-07 RX ADMIN — LORAZEPAM ONE MG: 2 INJECTION, SOLUTION INTRAMUSCULAR; INTRAVENOUS at 20:54

## 2025-02-07 RX ADMIN — CYCLOBENZAPRINE HYDROCHLORIDE SCH MG: 10 TABLET, FILM COATED ORAL at 20:54

## 2025-02-07 RX ADMIN — ACETAMINOPHEN SCH MG: 325 TABLET ORAL at 14:06

## 2025-02-07 NOTE — DVH
CT Neck with Contrast



CLINICAL HISTORY: abscess



TECHNIQUE: CT of the neck was performed with 100 ml of omnipaque 300 administered intravenously. This
 exam was performed according to our departmental dose optimization program. Up-to-date CT equipment 
and radiation dose reduction techniques are utilized as appropriate.



[[employer]imetrics Exposure Report]



WID: 



COMPARISON: None



Neck findings: 



Brain and Orbits: Small chronic appearing bilateral cerebellar infarcts. Mild cerebral volume loss.  
Mild patchy low attenuation in the cerebral white matter consistent with nonspecific white matter dis
ease. Prior left ocular lens replacement.



Sinuses and Mastoids: The visualized paranasal sinuses and mastoid air cells are clear. 



Parotid and Submandibular Glands: The parotid and submandibular glands appear unremarkable. 



Cervical Lymph Nodes: No significant cervical adenopathy is seen. 



Thyroid: No significant thyroid abnormalities are seen. 



Larynx and Hypopharynx: The larynx and hypopharynx appear normal. 



Oral Cavity, Oropharynx, and Nasopharynx: The base of the tongue, oropharyngeal region, and posterior
 nasopharynx appear unremarkable.  



Cervical Vasculature: Calcified plaque in the bilateral internal carotid and left vertebral intracran
ial arteries. Moderate mixed atherosclerotic plaque in the aortic arch. Mild mixed atherosclerotic pl
aque in the bilateral carotid bifurcations.



Osseous Structures: Chronic appearing distal right clavicle fracture multilevel cervical spondylosis 
   



Visualized Upper Lungs: Sub 5 mm nodule in the right upper lobe along the right major fissure. Lungs 
are otherwise clear.



IMPRESSION: 



1. No well-formed fluid collection to suggest abscess.  No lymphadenopathy.



2. Small chronic appearing bilateral cerebellar infarcts



3. Mild cerebral volume loss and mild chronic microvascular ischemic change.



Electronically Signed by: Jyoti Amaral at 02/07/2025 20:15:58 PM

## 2025-02-07 NOTE — DVHPN2
Assessment/Plan


Assessment/Plan


Progress note





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 

intractable head and neck pain, seen by neurology outpatient, started on 

prednisone which improved but later returned after stopping. also have pain with

swallowing and jaw movement.





Physical exam


Alert oriented x3


PERRLA


CN II-XII intact


TMJ tenderness


no nuchal rigidity


stiff neck and shoulder muscles


decreased ROM in neck and jaw


no temporal tenderness


clear breath sounds


s1 s2 RRR 


abdomen soft nontender


trace LE edema


equal UE and LE strength bilaterally


sensory intact to touch





labs, imaging reviewed





Assessment and plan


Tension headache


TMJ vs trigerminal neuralgia


ruled out yee's angina


clinically ruled out meningitis


hypertension


JESUS likely VMN on CKD


decreased oral intake


odynophagia from oral trush? from prednisone? 


cervical spondylolysis





prednisone


tylenol


ativan once, flexeril


CT neck, CT head clear


IV bolus + maintenance


resume home antiHTN


nystatin


ensure


neuro consult


MRI C spine





diet cardiac


dvt ppx lovenox


Plan discussed with:  Patient


My Orders





                            Orders - BERNARD AGUILAR MD








Procedure Category Date Status





  Time 


 


Acetaminophen Tablet PHA 2/7/25 In Process





 (Tylenol Tablet)  14:00 


 


Neck With Contrast CT 2/7/25 Resulted





Soft  15:29 


 


Cyclobenzaprine PHA 2/7/25 In Process





Tablet (Flexeril  22:00 


 


Prednisone Tablet PHA 2/8/25 In Process





  10:00 


 


Basic Metabolic Panel LAB 2/8/25 Verified





  04:00 


 


Complete Blood Count LAB 2/8/25 Verified





  04:00 


 


Magnesium LAB 2/8/25 Verified





  04:00 


 


Phosphorus LAB 2/8/25 Verified





  04:00 











Date of Service:  Feb 7, 2025


Billing Provider:  BERNARD AGUILAR MD


Common Visit Codes:  [id number] INP/OBS CARE(HIGH)











BERNARD AGUILAR MD                Feb 7, 2025 20:38

## 2025-02-07 NOTE — ECG
[facility]

                                       

Test Date:    2025               Test Time:    20:00:40

Pat Name:     LETI MARSHALL             Department:   ER

Patient ID:   [id number]           Room:         0222T A

Gender:       F                        Technician:   LORIE

:          1935               Requested By: BRIANNA AYOUB

Order Number: [id number]        Reading MD:   Toni Cruz

                                 Measurements

Intervals                              Axis          

Rate:         123                      P:            18

ME:           139                      QRS:          -60

QRSD:         79                       T:            42

QT:           295                                    

QTc:          422                                    

                           Interpretive Statements

Sinus tachycardia

Probable left atrial enlargement

Abnormal R-wave progression, late transition

Inferior infarct, old

Electronically Signed On 2025 12:11:43 PST by Toni Cruz



Please click the below link to view image of tracing.

## 2025-02-08 VITALS
RESPIRATION RATE: 18 BRPM | HEART RATE: 94 BPM | OXYGEN SATURATION: 94 % | DIASTOLIC BLOOD PRESSURE: 70 MMHG | SYSTOLIC BLOOD PRESSURE: 133 MMHG | TEMPERATURE: 98.2 F

## 2025-02-08 VITALS — HEART RATE: 103 BPM | SYSTOLIC BLOOD PRESSURE: 141 MMHG | RESPIRATION RATE: 18 BRPM | DIASTOLIC BLOOD PRESSURE: 89 MMHG

## 2025-02-08 LAB
ANION GAP SERPL CALCULATED.3IONS-SCNC: 8 MMOL/L (ref 5–15)
BUN SERPL-MCNC: 13 MG/DL (ref 9–23)
BUN/CREAT SERPL: 12.3 (ref 10–20)
CALCIUM SERPL-MCNC: 9 MG/DL (ref 8.7–10.4)
CHLORIDE SERPL-SCNC: 102 MMOL/L (ref 98–107)
CO2 SERPL-SCNC: 24 MMOL/L (ref 20–31)
GLUCOSE SERPL-MCNC: 94 MG/DL (ref 74–106)
HCT VFR BLD AUTO: 34.4 % (ref 36–46)
HGB BLD-MCNC: 12 G/DL (ref 12.2–16.2)
MAGNESIUM SERPL-MCNC: 2 MG/DL (ref 1.6–2.6)
MCH RBC QN AUTO: 31.9 PG (ref 28–32)
MCV RBC AUTO: 91.6 FL (ref 80–100)
NRBC BLD QL AUTO: 0 %
POTASSIUM SERPL-SCNC: 4 MMOL/L (ref 3.5–5.1)
SODIUM SERPL-SCNC: 134 MMOL/L (ref 136–145)

## 2025-02-08 RX ADMIN — MORPHINE SULFATE PRN MG: 2 INJECTION, SOLUTION INTRAMUSCULAR; INTRAVENOUS at 09:16

## 2025-02-08 NOTE — DVHPN2
Assessment/Plan


Assessment/Plan


Progress note





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 

intractable head and neck pain, seen by neurology outpatient, started on 

prednisone which improved but later returned after stopping. also have pain with

swallowing and jaw movement.





seen during rounds, improving but still symptomatic mostly on jaw pain. pending 

MRI. refusing pain management 





Physical exam


Alert oriented x3


PERRLA


CN II-XII intact


TMJ tenderness


no nuchal rigidity


stiff neck and shoulder muscles


decreased ROM in neck and jaw


no temporal tenderness


clear breath sounds


s1 s2 RRR 


abdomen soft nontender


trace LE edema


equal UE and LE strength bilaterally


sensory intact to touch





labs, imaging reviewed





Assessment and plan


Tension headache


TMJ vs trigerminal neuralgia


ruled out yee's angina


clinically ruled out meningitis


hypertension


JESUS likely VMN on CKD


decreased oral intake


odynophagia from oral trush? from prednisone? 


cervical spondylolysis





prednisone


tylenol


ativan once, flexeril


CT neck, CT head clear


IV bolus + maintenance


resume home antiHTN


nystatin


ensure


neuro consult


MRI C spine





diet cardiac


dvt ppx lovenox


Plan discussed with:  Patient


My Orders





                            Orders - BERNARD AGUILAR MD








Procedure Category Date Status





  Time 


 


Acetaminophen Tablet PHA 2/7/25 In Process





 (Tylenol Tablet)  14:00 


 


Neck With Contrast CT 2/7/25 Resulted





Soft  15:29 


 


Cyclobenzaprine PHA 2/7/25 In Process





Tablet (Flexeril  22:00 


 


Prednisone Tablet PHA 2/8/25 In Process





  10:00 


 


Morphine Sulfate PHA 2/7/25 In Process





Injection  20:45 


 


Nystatin PHA 2/7/25 In Process





 (Mouth-Throat)  22:00 


 


Cervical Wo Contrast MRI 2/8/25 Logged





  20:38 











Date of Service:  Feb 8, 2025


Billing Provider:  BERNARD AGUILAR MD


Common Visit Codes:  [id number] INP/OBS CARE(HIGH)











BERNARD AGUILAR MD                Feb 8, 2025 09:01

## 2025-02-08 NOTE — DVHDS2
Discharge Summary


Date of Admission


Feb 6, 2025 at 23:12





Date of Discharge:


Feb 8, 2025





Labs/Diagnostic Data:





                               Laboratory Results








Test


 2/8/25


05:43 2/7/25


06:01 2/7/25


01:30 2/6/25


21:59


 


White Blood Count


 10.6 10^3/uL


(4.4-10.8) 


 


 





 


Red Blood Count


 3.76 10^6/uL


(4.0-5.20) 


 


 





 


Hemoglobin


 12.0 g/dL


(12.2-16.2) 


 


 





 


Hematocrit


 34.4 %


(36.0-46.0) 


 


 





 


Mean Corpuscular Volume


 91.6 fL


(80.0-100.0) 


 


 





 


Mean Corpuscular Hemoglobin


 31.9 pg


(28.0-32.0) 


 


 





 


Mean Corpuscular Hemoglobin


Concent 34.9 g/dL


(32.0-36.0) 


 


 





 


Red Cell Distribution Width


 12.9 %


(11.8-14.3) 


 


 





 


Platelet Count


 322 10^3/uL


(140-450) 


 


 





 


Mean Platelet Volume


 7.8 fL


(6.9-10.8) 


 


 





 


Neutrophils (%) (Auto)


 73.2 %


(37.0-80.0) 


 


 





 


Lymphocytes (%) (Auto)


 13.6 %


(10.0-50.0) 


 


 





 


Monocytes (%) (Auto)


 10.3 %


(0.0-12.0) 


 


 





 


Eosinophils (%) (Auto)


 2.6 %


(0.0-7.0) 


 


 





 


Basophils (%) (Auto)


 0.3 %


(0.0-2.0) 


 


 





 


Neutrophils # (Auto)


 7.8 10 ^3/uL


(1.6-8.6) 


 


 





 


Lymphocytes # (Auto)


 1.4 10 ^3/uL


(0.4-5.4) 


 


 





 


Monocytes # (Auto)


 1.1 10 ^3/uL


(0-1.3) 


 


 





 


Eosinophils # (Auto)


 0.3 10 ^3/uL


(0-0.8) 


 


 





 


Basophils # (Auto)


 0 10 ^3/uL


(0-0.2) 


 


 





 


Nucleated Red Blood Cells 0.0 %    


 


Sodium Level


 134 mmol/L


(136-145) 


 


 





 


Potassium Level


 4.0 mmol/L


(3.5-5.1) 


 


 





 


Chloride Level


 102 mmol/L


() 


 


 





 


Carbon Dioxide Level


 24 mmol/L


(20-31) 


 


 





 


Anion Gap 8 (5-15)    


 


Blood Urea Nitrogen


 13 mg/dL


(9-23) 


 


 





 


Creatinine


 1.06 mg/dL


(0.550-1.02) 


 


 





 


Glomerular Filtration Rate


Calc 50 mL/min


(>90) 


 


 





 


BUN/Creatinine Ratio


 12.3


(10.0-20.0) 


 


 





 


Serum Glucose


 94 mg/dL


() 


 


 





 


Calcium Level


 9.0 mg/dL


(8.7-10.4) 


 


 





 


Phosphorus Level


 3.2 mg/dL


(2.4-5.1) 


 


 





 


Magnesium Level


 2.0 mg/dL


(1.6-2.6) 


 


 





 


Total Bilirubin


 


 0.5 mg/dL


(0.2-1.0) 


 





 


Aspartate Amino Transferase


(AST) 


 12 U/L (13-40) 


 


 





 


Alanine Aminotransferase (ALT)  < 9 U/L (7-40)   


 


Alkaline Phosphatase


 


 71 U/L


() 


 





 


Total Protein


 


 5.8 g/dL


(5.7-8.2) 


 





 


Albumin


 


 3.8 g/dL


(3.2-4.8) 


 





 


Urine Color


 


 


 Light-yellow


(Yellow) 





 


Urine Clarity   Clear (Clear)  


 


Urine pH   6.5 (5.0-9.0)  


 


Urine Specific Gravity


 


 


 1.008


(1.001-1.035) 





 


Urine Protein


 


 


 Negative


(Negative) 





 


Urine Ketones


 


 


 Trace


(Negative) 





 


Urine Blood


 


 


 Negative /uL


(Negative) 





 


Urine Nitrite


 


 


 Negative


(Negative) 





 


Urine Bilirubin


 


 


 Negative


(Negative) 





 


Urine Urobilinogen


 


 


 Normal mg/dL


(Negative) 





 


Urine Leukocyte Esterase


 


 


 Trace /uL


(Negative) 





 


Urine RBC


 


 


 <1 /hpf (0 -


4) 





 


Urine Microscopic WBC   1 /HPF (0-5)  


 


Urine Squamous Epithelial


Cells 


 


 Few /hpf (<5) 


 





 


Urine Bacteria


 


 


 None seen /hpf


(None Seen) 





 


Urine Glucose


 


 


 Normal mg/dL


(Normal) 





 


Lactic Acid Level


 


 


 


 1.2 mmol/L


(0.4-2.0)


 


Test


 2/6/25


21:50 2/6/25


20:48 2/6/25


19:36 





 


Influenza Type A Antigen


 Negative


(Negative) 


 


 





 


Influenza Type B Antigen


 Negative


(Negative) 


 


 





 


SARS-CoV-2 Antigen (Rapid)


 Negative


(NEGATIVE) 


 


 





 


Thyroid Stimulating Hormone


(TSH) 


 1.80 uIU/mL


(0.55-4.78) 


 





 


POC Glucose


 


 


 154 mg/dl


() 








                             Other Laboratory Tests


2/8/25 05:43











Brief Hx & Hospital Course:


88 yo F admitted for headache, on my assessment patient have more jaw pain and 

neck pain. Given medication for muscle relaxant, tylenol, morphine. Patient was 

given prednisone by neuro which improves the pain but later returned after 

stopping prednisone. Seh also complained of throat pain. She has been refusing 

prednisone, flexeril and tylenol. Studies done to r/o yee's, also plan to MRI

neck however patient left AMA before full workup could be done.





Condition at Discharge:


Fair





Final Diagnosis/Problems List





acute on chronic cervicalgia





Discharge Disposition:


AMA


39


Discharge Statement:


"Patient was advised to return to the ER or call 911 if any headaches, 

dizziness, shortness of breath, chest pain, abdominal pain, bleeding, fevers, or

worsening of medical condition.





Patient was counseled about treatment plan, medications, possible side effects, 

patientverbalized understanding. All questions were answered to the best of my 

ability.





This discharge took greater then 30 minutes in planning, reviewing 

documentation, counseling the patient, and discussing with other team members."





ASSESSMENT


Tension headache


TMJ vs trigerminal neuralgia


ruled out yee's angina


clinically ruled out meningitis


hypertension


JESUS likely VMN on CKD


decreased oral intake


odynophagia from oral trush? from prednisone? 


cervical spondylolysis chronic





Date of Service:  Feb 8, 2025


Billing Provider:  BERNARD AGUILAR MD


Common Visit Codes:  33767-ACK/OBS DISCH DAY >30min











BERNARD AGUILAR MD                Feb 8, 2025 12:54

## 2025-03-20 ENCOUNTER — HOSPITAL ENCOUNTER (OUTPATIENT)
Dept: HOSPITAL 15 - LAB | Age: OVER 89
Discharge: HOME | End: 2025-03-20
Attending: INTERNAL MEDICINE
Payer: COMMERCIAL

## 2025-03-20 DIAGNOSIS — L40.50: ICD-10-CM

## 2025-03-20 DIAGNOSIS — Z79.899: ICD-10-CM

## 2025-03-20 DIAGNOSIS — N18.30: Primary | ICD-10-CM

## 2025-03-20 LAB
ALBUMIN SERPL-MCNC: 4.4 G/DL (ref 3.2–4.8)
ALP SERPL-CCNC: 80 U/L (ref 46–116)
ALT SERPL-CCNC: 18 U/L (ref 7–40)
ANION GAP SERPL CALCULATED.3IONS-SCNC: 6 MMOL/L (ref 5–15)
BILIRUB SERPL-MCNC: 0.5 MG/DL (ref 0.2–1)
BUN SERPL-MCNC: 13 MG/DL (ref 9–23)
BUN/CREAT SERPL: 9.7 (ref 10–20)
CALCIUM SERPL-MCNC: 10.3 MG/DL (ref 8.7–10.4)
CHLORIDE SERPL-SCNC: 104 MMOL/L (ref 98–107)
CK SERPL-CCNC: 41 U/L (ref 34–145)
CO2 SERPL-SCNC: 29 MMOL/L (ref 20–31)
GLUCOSE SERPL-MCNC: 100 MG/DL (ref 74–106)
HCT VFR BLD AUTO: 35.2 % (ref 36–46)
HGB BLD-MCNC: 11.9 G/DL (ref 12.2–16.2)
MCH RBC QN AUTO: 31.4 PG (ref 28–32)
MCV RBC AUTO: 92.9 FL (ref 80–100)
NRBC BLD QL AUTO: 0 %
POTASSIUM SERPL-SCNC: 3.9 MMOL/L (ref 3.5–5.1)
PROT SERPL-MCNC: 6.7 G/DL (ref 5.7–8.2)
SODIUM SERPL-SCNC: 139 MMOL/L (ref 136–145)

## 2025-03-20 PROCEDURE — 85652 RBC SED RATE AUTOMATED: CPT

## 2025-03-20 PROCEDURE — 82550 ASSAY OF CK (CPK): CPT

## 2025-03-20 PROCEDURE — 85025 COMPLETE CBC W/AUTO DIFF WBC: CPT

## 2025-03-20 PROCEDURE — 36415 COLL VENOUS BLD VENIPUNCTURE: CPT

## 2025-03-20 PROCEDURE — 86141 C-REACTIVE PROTEIN HS: CPT

## 2025-03-20 PROCEDURE — 80053 COMPREHEN METABOLIC PANEL: CPT

## 2025-04-10 ENCOUNTER — HOSPITAL ENCOUNTER (OUTPATIENT)
Dept: HOSPITAL 15 - LAB | Age: OVER 89
Discharge: HOME | End: 2025-04-10
Attending: INTERNAL MEDICINE
Payer: COMMERCIAL

## 2025-04-10 DIAGNOSIS — Z79.899: ICD-10-CM

## 2025-04-10 DIAGNOSIS — M31.5: ICD-10-CM

## 2025-04-10 DIAGNOSIS — L40.50: Primary | ICD-10-CM

## 2025-04-10 LAB
ALBUMIN SERPL-MCNC: 4.7 G/DL (ref 3.2–4.8)
ALP SERPL-CCNC: 72 U/L (ref 46–116)
ALT SERPL-CCNC: 21 U/L (ref 7–40)
ANION GAP SERPL CALCULATED.3IONS-SCNC: 9 MMOL/L (ref 5–15)
BILIRUB SERPL-MCNC: 0.6 MG/DL (ref 0.2–1)
BUN SERPL-MCNC: 14 MG/DL (ref 9–23)
BUN/CREAT SERPL: 10.2 (ref 10–20)
CALCIUM SERPL-MCNC: 10.7 MG/DL (ref 8.7–10.4)
CHLORIDE SERPL-SCNC: 103 MMOL/L (ref 98–107)
CHOLEST SERPL-MCNC: 270 MG/DL (ref ?–200)
CO2 SERPL-SCNC: 25 MMOL/L (ref 20–31)
GLUCOSE SERPL-MCNC: 90 MG/DL (ref 74–106)
HCT VFR BLD AUTO: 41.7 % (ref 36–46)
HDLC SERPL-MCNC: 93 MG/DL (ref 40–59)
MCH RBC QN AUTO: 31.3 PG (ref 28–32)
MCV RBC AUTO: 93.6 FL (ref 80–100)
POTASSIUM SERPL-SCNC: 4.2 MMOL/L (ref 3.5–5.1)
PROT SERPL-MCNC: 7.2 G/DL (ref 5.7–8.2)
SODIUM SERPL-SCNC: 137 MMOL/L (ref 136–145)
TRIGL SERPL-MCNC: 214 MG/DL (ref ?–150)

## 2025-04-10 PROCEDURE — 85025 COMPLETE CBC W/AUTO DIFF WBC: CPT

## 2025-04-10 PROCEDURE — 86706 HEP B SURFACE ANTIBODY: CPT

## 2025-04-10 PROCEDURE — 86803 HEPATITIS C AB TEST: CPT

## 2025-04-10 PROCEDURE — 87340 HEPATITIS B SURFACE AG IA: CPT

## 2025-04-10 PROCEDURE — 86141 C-REACTIVE PROTEIN HS: CPT

## 2025-04-10 PROCEDURE — 80053 COMPREHEN METABOLIC PANEL: CPT

## 2025-04-10 PROCEDURE — 80061 LIPID PANEL: CPT

## 2025-04-10 PROCEDURE — 36415 COLL VENOUS BLD VENIPUNCTURE: CPT

## 2025-04-10 PROCEDURE — 85652 RBC SED RATE AUTOMATED: CPT

## 2025-04-10 PROCEDURE — 86704 HEP B CORE ANTIBODY TOTAL: CPT

## 2025-04-10 PROCEDURE — 86708 HEPATITIS A ANTIBODY: CPT

## 2025-06-20 ENCOUNTER — HOSPITAL ENCOUNTER (OUTPATIENT)
Dept: HOSPITAL 15 - LAB | Age: OVER 89
Discharge: HOME | End: 2025-06-20
Attending: INTERNAL MEDICINE
Payer: COMMERCIAL

## 2025-06-20 DIAGNOSIS — Z79.899: ICD-10-CM

## 2025-06-20 DIAGNOSIS — L40.50: Primary | ICD-10-CM

## 2025-06-20 LAB
ALBUMIN SERPL-MCNC: 4.3 G/DL (ref 3.2–4.8)
ALP SERPL-CCNC: 60 U/L (ref 46–116)
ALT SERPL-CCNC: 21 U/L (ref 7–40)
ANION GAP SERPL CALCULATED.3IONS-SCNC: 8 MMOL/L (ref 5–15)
AST SERPL-CCNC: 23 U/L (ref ?–34)
BASOPHILS # BLD AUTO: 0 10 ^3/UL (ref 0–0.2)
BASOPHILS NFR BLD AUTO: 0.4 % (ref 0–2)
BILIRUB SERPL-MCNC: 0.6 MG/DL (ref 0.2–1)
BUN SERPL-MCNC: 19 MG/DL (ref 9–23)
BUN/CREAT SERPL: 11.9 (ref 10–20)
CALCIUM SERPL-MCNC: 10.3 MG/DL (ref 8.7–10.4)
CHLORIDE SERPL-SCNC: 107 MMOL/L (ref 98–107)
CO2 SERPL-SCNC: 28 MMOL/L (ref 20–31)
CREAT SERPL-MCNC: 1.59 MG/DL (ref 0.55–1.02)
CRP SERPL HS-MCNC: 0.03 MG/DL (ref ?–1)
EOSINOPHIL # BLD AUTO: 0.1 10 ^3/UL (ref 0–0.8)
EOSINOPHIL NFR BLD AUTO: 1 % (ref 0–7)
ERYTHROCYTE [DISTWIDTH] IN BLOOD BY AUTOMATED COUNT: 13.7 % (ref 11.8–14.3)
ERYTHROCYTE [SEDIMENTATION RATE] IN BLOOD: 8 MM/HR (ref 0–20)
GLUCOSE SERPL-MCNC: 87 MG/DL (ref 74–106)
HCT VFR BLD AUTO: 41.4 % (ref 36–46)
HGB BLD-MCNC: 14.2 G/DL (ref 12.2–16.2)
LYMPHOCYTES # BLD AUTO: 1.6 10 ^3/UL (ref 0.4–5.4)
LYMPHOCYTES NFR BLD AUTO: 16.3 % (ref 10–50)
MCH RBC QN AUTO: 31.3 PG (ref 28–32)
MCHC RBC AUTO-ENTMCNC: 34.2 G/DL (ref 32–36)
MCV RBC AUTO: 91.3 FL (ref 80–100)
MONOCYTES # BLD AUTO: 0.7 10 ^3/UL (ref 0–1.3)
MONOCYTES NFR BLD AUTO: 7.5 % (ref 0–12)
NEUTROPHILS # BLD AUTO: 7.3 10 ^3/UL (ref 1.6–8.6)
NEUTROPHILS NFR BLD AUTO: 74.8 % (ref 37–80)
NRBC BLD QL AUTO: 0 %
PLATELET # BLD AUTO: 252 10^3/UL (ref 140–450)
POTASSIUM SERPL-SCNC: 4.1 MMOL/L (ref 3.5–5.1)
PROT SERPL-MCNC: 6.5 G/DL (ref 5.7–8.2)
RBC # BLD AUTO: 4.53 10^6/UL (ref 4–5.2)
SODIUM SERPL-SCNC: 143 MMOL/L (ref 136–145)
WBC # BLD AUTO: 9.8 10^3/UL (ref 4.4–10.8)

## 2025-06-20 PROCEDURE — 36415 COLL VENOUS BLD VENIPUNCTURE: CPT

## 2025-06-20 PROCEDURE — 80053 COMPREHEN METABOLIC PANEL: CPT

## 2025-06-20 PROCEDURE — 86141 C-REACTIVE PROTEIN HS: CPT

## 2025-06-20 PROCEDURE — 85025 COMPLETE CBC W/AUTO DIFF WBC: CPT

## 2025-06-20 PROCEDURE — 85652 RBC SED RATE AUTOMATED: CPT
